# Patient Record
Sex: MALE | Race: OTHER | HISPANIC OR LATINO | Employment: FULL TIME | URBAN - METROPOLITAN AREA
[De-identification: names, ages, dates, MRNs, and addresses within clinical notes are randomized per-mention and may not be internally consistent; named-entity substitution may affect disease eponyms.]

---

## 2017-01-21 ENCOUNTER — APPOINTMENT (OUTPATIENT)
Dept: LAB | Facility: MEDICAL CENTER | Age: 52
End: 2017-01-21
Payer: COMMERCIAL

## 2017-01-21 ENCOUNTER — TRANSCRIBE ORDERS (OUTPATIENT)
Dept: ADMINISTRATIVE | Facility: HOSPITAL | Age: 52
End: 2017-01-21

## 2017-01-21 DIAGNOSIS — E55.9 UNSPECIFIED VITAMIN D DEFICIENCY: ICD-10-CM

## 2017-01-21 DIAGNOSIS — E78.5 HYPERLIPIDEMIA, UNSPECIFIED HYPERLIPIDEMIA TYPE: ICD-10-CM

## 2017-01-21 DIAGNOSIS — R73.9 BLOOD GLUCOSE ELEVATED: ICD-10-CM

## 2017-01-21 DIAGNOSIS — N42.9 UNSPECIFIED DISORDER OF PROSTATE: Primary | ICD-10-CM

## 2017-01-21 DIAGNOSIS — Z51.81 ENCOUNTER FOR THERAPEUTIC DRUG MONITORING: ICD-10-CM

## 2017-01-21 DIAGNOSIS — R35.0 URINARY FREQUENCY: ICD-10-CM

## 2017-01-21 DIAGNOSIS — N42.9 UNSPECIFIED DISORDER OF PROSTATE: ICD-10-CM

## 2017-01-21 DIAGNOSIS — I10 ESSENTIAL HYPERTENSION, MALIGNANT: ICD-10-CM

## 2017-01-21 LAB
25(OH)D3 SERPL-MCNC: 23.9 NG/ML (ref 30–100)
ALBUMIN SERPL BCP-MCNC: 3.9 G/DL (ref 3.5–5)
ALP SERPL-CCNC: 56 U/L (ref 46–116)
ALT SERPL W P-5'-P-CCNC: 21 U/L (ref 12–78)
ANION GAP SERPL CALCULATED.3IONS-SCNC: 8 MMOL/L (ref 4–13)
AST SERPL W P-5'-P-CCNC: 18 U/L (ref 5–45)
BILIRUB SERPL-MCNC: 0.59 MG/DL (ref 0.2–1)
BUN SERPL-MCNC: 18 MG/DL (ref 5–25)
CALCIUM SERPL-MCNC: 9 MG/DL (ref 8.3–10.1)
CHLORIDE SERPL-SCNC: 103 MMOL/L (ref 100–108)
CHOLEST SERPL-MCNC: 178 MG/DL (ref 50–200)
CO2 SERPL-SCNC: 31 MMOL/L (ref 21–32)
CREAT SERPL-MCNC: 1.13 MG/DL (ref 0.6–1.3)
EST. AVERAGE GLUCOSE BLD GHB EST-MCNC: 123 MG/DL
GFR SERPL CREATININE-BSD FRML MDRD: >60 ML/MIN/1.73SQ M
GLUCOSE SERPL-MCNC: 99 MG/DL (ref 65–140)
HBA1C MFR BLD: 5.9 % (ref 4.2–6.3)
HDLC SERPL-MCNC: 50 MG/DL (ref 40–60)
LDLC SERPL CALC-MCNC: 85 MG/DL (ref 0–100)
POTASSIUM SERPL-SCNC: 3.5 MMOL/L (ref 3.5–5.3)
PROT SERPL-MCNC: 8 G/DL (ref 6.4–8.2)
PSA SERPL-MCNC: 0.5 NG/ML (ref 0–4)
SODIUM SERPL-SCNC: 142 MMOL/L (ref 136–145)
TRIGL SERPL-MCNC: 216 MG/DL

## 2017-01-21 PROCEDURE — 36415 COLL VENOUS BLD VENIPUNCTURE: CPT

## 2017-01-21 PROCEDURE — 80061 LIPID PANEL: CPT

## 2017-01-21 PROCEDURE — 80053 COMPREHEN METABOLIC PANEL: CPT

## 2017-01-21 PROCEDURE — 82306 VITAMIN D 25 HYDROXY: CPT

## 2017-01-21 PROCEDURE — G0103 PSA SCREENING: HCPCS

## 2017-01-21 PROCEDURE — 83036 HEMOGLOBIN GLYCOSYLATED A1C: CPT

## 2017-09-21 ENCOUNTER — OFFICE VISIT (OUTPATIENT)
Dept: URGENT CARE | Facility: MEDICAL CENTER | Age: 52
End: 2017-09-21
Payer: COMMERCIAL

## 2017-09-21 PROCEDURE — 99203 OFFICE O/P NEW LOW 30 MIN: CPT

## 2017-10-06 ENCOUNTER — APPOINTMENT (OUTPATIENT)
Dept: LAB | Facility: MEDICAL CENTER | Age: 52
End: 2017-10-06
Payer: COMMERCIAL

## 2017-10-06 ENCOUNTER — TRANSCRIBE ORDERS (OUTPATIENT)
Dept: ADMINISTRATIVE | Facility: HOSPITAL | Age: 52
End: 2017-10-06

## 2017-10-06 DIAGNOSIS — R35.0 URINARY FREQUENCY: ICD-10-CM

## 2017-10-06 DIAGNOSIS — Z51.81 ENCOUNTER FOR THERAPEUTIC DRUG MONITORING: ICD-10-CM

## 2017-10-06 DIAGNOSIS — R73.9 BLOOD GLUCOSE ELEVATED: ICD-10-CM

## 2017-10-06 DIAGNOSIS — R80.9 PROTEINURIA, UNSPECIFIED TYPE: ICD-10-CM

## 2017-10-06 DIAGNOSIS — E78.5 HYPERLIPIDEMIA, UNSPECIFIED HYPERLIPIDEMIA TYPE: ICD-10-CM

## 2017-10-06 DIAGNOSIS — E78.5 HYPERLIPIDEMIA, UNSPECIFIED HYPERLIPIDEMIA TYPE: Primary | ICD-10-CM

## 2017-10-06 DIAGNOSIS — I10 ESSENTIAL HYPERTENSION, MALIGNANT: ICD-10-CM

## 2017-10-06 LAB
ALT SERPL W P-5'-P-CCNC: 19 U/L (ref 12–78)
AMORPH URATE CRY URNS QL MICRO: NORMAL /HPF
ANION GAP SERPL CALCULATED.3IONS-SCNC: 7 MMOL/L (ref 4–13)
BACTERIA UR QL AUTO: NORMAL /HPF
BILIRUB UR QL STRIP: ABNORMAL
BUN SERPL-MCNC: 15 MG/DL (ref 5–25)
CALCIUM SERPL-MCNC: 9.1 MG/DL (ref 8.3–10.1)
CHLORIDE SERPL-SCNC: 103 MMOL/L (ref 100–108)
CLARITY UR: ABNORMAL
CO2 SERPL-SCNC: 29 MMOL/L (ref 21–32)
COLOR UR: ABNORMAL
CREAT SERPL-MCNC: 1.07 MG/DL (ref 0.6–1.3)
CREAT UR-MCNC: 430 MG/DL
EST. AVERAGE GLUCOSE BLD GHB EST-MCNC: 131 MG/DL
GFR SERPL CREATININE-BSD FRML MDRD: 79 ML/MIN/1.73SQ M
GLUCOSE P FAST SERPL-MCNC: 94 MG/DL (ref 65–99)
GLUCOSE UR STRIP-MCNC: NEGATIVE MG/DL
HBA1C MFR BLD: 6.2 % (ref 4.2–6.3)
HGB UR QL STRIP.AUTO: ABNORMAL
KETONES UR STRIP-MCNC: NEGATIVE MG/DL
LDLC SERPL DIRECT ASSAY-MCNC: 90 MG/DL (ref 0–100)
LEUKOCYTE ESTERASE UR QL STRIP: NEGATIVE
MICROALBUMIN UR-MCNC: 31 MG/L (ref 0–20)
MICROALBUMIN/CREAT 24H UR: 7 MG/G CREATININE (ref 0–30)
NITRITE UR QL STRIP: NEGATIVE
NON-SQ EPI CELLS URNS QL MICRO: NORMAL /HPF
PH UR STRIP.AUTO: 5.5 [PH] (ref 4.5–8)
POTASSIUM SERPL-SCNC: 3.7 MMOL/L (ref 3.5–5.3)
PROT UR STRIP-MCNC: ABNORMAL MG/DL
RBC #/AREA URNS AUTO: NORMAL /HPF
SODIUM SERPL-SCNC: 139 MMOL/L (ref 136–145)
SP GR UR STRIP.AUTO: 1.03 (ref 1–1.03)
UROBILINOGEN UR QL STRIP.AUTO: 0.2 E.U./DL
WBC #/AREA URNS AUTO: NORMAL /HPF

## 2017-10-06 PROCEDURE — 36415 COLL VENOUS BLD VENIPUNCTURE: CPT

## 2017-10-06 PROCEDURE — 80048 BASIC METABOLIC PNL TOTAL CA: CPT

## 2017-10-06 PROCEDURE — 83036 HEMOGLOBIN GLYCOSYLATED A1C: CPT

## 2017-10-06 PROCEDURE — 84460 ALANINE AMINO (ALT) (SGPT): CPT

## 2017-10-06 PROCEDURE — 83721 ASSAY OF BLOOD LIPOPROTEIN: CPT

## 2017-10-06 PROCEDURE — 82570 ASSAY OF URINE CREATININE: CPT | Performed by: FAMILY MEDICINE

## 2017-10-06 PROCEDURE — 82043 UR ALBUMIN QUANTITATIVE: CPT | Performed by: FAMILY MEDICINE

## 2017-10-06 PROCEDURE — 81001 URINALYSIS AUTO W/SCOPE: CPT | Performed by: FAMILY MEDICINE

## 2018-06-06 ENCOUNTER — TRANSCRIBE ORDERS (OUTPATIENT)
Dept: ADMINISTRATIVE | Facility: HOSPITAL | Age: 53
End: 2018-06-06

## 2018-06-06 ENCOUNTER — APPOINTMENT (OUTPATIENT)
Dept: LAB | Facility: MEDICAL CENTER | Age: 53
End: 2018-06-06
Payer: COMMERCIAL

## 2018-06-06 DIAGNOSIS — E78.5 HYPERLIPIDEMIA, UNSPECIFIED HYPERLIPIDEMIA TYPE: Primary | ICD-10-CM

## 2018-06-06 DIAGNOSIS — Z51.81 MEDICATION MONITORING ENCOUNTER: ICD-10-CM

## 2018-06-06 DIAGNOSIS — E78.5 HYPERLIPIDEMIA, UNSPECIFIED HYPERLIPIDEMIA TYPE: ICD-10-CM

## 2018-06-06 DIAGNOSIS — I10 HYPERTENSION, UNSPECIFIED TYPE: ICD-10-CM

## 2018-06-06 DIAGNOSIS — E79.0 HYPERURICEMIA: ICD-10-CM

## 2018-06-06 LAB
ALBUMIN SERPL BCP-MCNC: 3.9 G/DL (ref 3.5–5)
ALP SERPL-CCNC: 57 U/L (ref 46–116)
ALT SERPL W P-5'-P-CCNC: 23 U/L (ref 12–78)
ANION GAP SERPL CALCULATED.3IONS-SCNC: 6 MMOL/L (ref 4–13)
AST SERPL W P-5'-P-CCNC: 26 U/L (ref 5–45)
BILIRUB SERPL-MCNC: 0.66 MG/DL (ref 0.2–1)
BUN SERPL-MCNC: 20 MG/DL (ref 5–25)
CALCIUM SERPL-MCNC: 9.3 MG/DL (ref 8.3–10.1)
CHLORIDE SERPL-SCNC: 104 MMOL/L (ref 100–108)
CO2 SERPL-SCNC: 29 MMOL/L (ref 21–32)
CREAT SERPL-MCNC: 1.05 MG/DL (ref 0.6–1.3)
EST. AVERAGE GLUCOSE BLD GHB EST-MCNC: 140 MG/DL
GFR SERPL CREATININE-BSD FRML MDRD: 81 ML/MIN/1.73SQ M
GLUCOSE P FAST SERPL-MCNC: 100 MG/DL (ref 65–99)
HBA1C MFR BLD: 6.5 % (ref 4.2–6.3)
LDLC SERPL DIRECT ASSAY-MCNC: 91 MG/DL (ref 0–100)
POTASSIUM SERPL-SCNC: 3.5 MMOL/L (ref 3.5–5.3)
PROT SERPL-MCNC: 7.5 G/DL (ref 6.4–8.2)
SODIUM SERPL-SCNC: 139 MMOL/L (ref 136–145)
URATE SERPL-MCNC: 5 MG/DL (ref 4.2–8)

## 2018-06-06 PROCEDURE — 80053 COMPREHEN METABOLIC PANEL: CPT

## 2018-06-06 PROCEDURE — 83721 ASSAY OF BLOOD LIPOPROTEIN: CPT

## 2018-06-06 PROCEDURE — 84550 ASSAY OF BLOOD/URIC ACID: CPT

## 2018-06-06 PROCEDURE — 83036 HEMOGLOBIN GLYCOSYLATED A1C: CPT

## 2018-06-06 PROCEDURE — 36415 COLL VENOUS BLD VENIPUNCTURE: CPT

## 2021-01-07 ENCOUNTER — TRANSCRIBE ORDERS (OUTPATIENT)
Dept: PHYSICAL THERAPY | Facility: MEDICAL CENTER | Age: 56
End: 2021-01-07

## 2021-01-07 ENCOUNTER — EVALUATION (OUTPATIENT)
Dept: PHYSICAL THERAPY | Facility: MEDICAL CENTER | Age: 56
End: 2021-01-07
Payer: COMMERCIAL

## 2021-01-07 DIAGNOSIS — M25.511 RIGHT SHOULDER PAIN, UNSPECIFIED CHRONICITY: Primary | ICD-10-CM

## 2021-01-07 PROCEDURE — 97161 PT EVAL LOW COMPLEX 20 MIN: CPT | Performed by: PHYSICAL THERAPIST

## 2021-01-07 PROCEDURE — 97110 THERAPEUTIC EXERCISES: CPT | Performed by: PHYSICAL THERAPIST

## 2021-01-07 PROCEDURE — 97140 MANUAL THERAPY 1/> REGIONS: CPT | Performed by: PHYSICAL THERAPIST

## 2021-01-07 NOTE — LETTER
2021    MD Ismael Santana    Patient: Elmo Conte   YOB: 1965   Date of Visit: 2021     Encounter Diagnosis     ICD-10-CM    1  Right shoulder pain, unspecified chronicity  M25 511        Dear Dr Rashmi Ruvalcaba: Thank you for your recent referral of Elmo Conte  Please review the attached evaluation summary from Wander's recent visit  Please verify that you agree with the plan of care by signing the attached order  If you have any questions or concerns, please do not hesitate to call  I sincerely appreciate the opportunity to share in the care of one of your patients and hope to have another opportunity to work with you in the near future  Sincerely,    Melody Davalos, PT      Referring Provider:      I certify that I have read the below Plan of Care and certify the need for these services furnished under this plan of treatment while under my care  MD Ismael Santana  Via Fax: [phone]          PT Evaluation     Today's date: 2021  Patient name: Elmo Conte  : 1965  MRN: [id number]  Referring provider: Hilda Rajan MD  Dx:   Encounter Diagnosis     ICD-10-CM    1  Right shoulder pain, unspecified chronicity  M25 511          Assessment  Assessment details: Pt is a pleasant 64 yo male presenting to physical therapy with R shoulder pain  Pt would benefit from skilled PT to address current impairments and return pt to pre-morbid function      Understanding of Dx/Px/POC: good   Prognosis: good    Goals  Impairment Goals  - Pt I with initial HEP in 1-2 visits  - Improve ROM equal to contralateral side in 4-6 weeks  - Increase strength to 5/5 in all affected areas in 4-6 weeks    Functional Goals  - Increase FOTO to at least 72 in 6-8 weeks  - Patient will be independent with comprehensive HEP in 6-8 weeks  - Patient will be able to reach for object overhead with min to difficulty in 6-8 weeks  - Patient will be able to lift/carry objects without provocation of symptoms in 6-8 weeks          Plan  Patient would benefit from: skilled physical therapy  Other planned modality interventions: Modalities prn  Planned therapy interventions: joint mobilization, neuromuscular re-education, patient education, postural training, strengthening, stretching, therapeutic activities, therapeutic exercise and home exercise program  Frequency: 2x week  Duration in weeks: 8  Treatment plan discussed with: patient        Subjective Evaluation    History of Present Illness  Mechanism of injury: Pt reports that approximately 3 months ago he woke up with severe R shoulder pain  He states that he couldn't lift his arm OH or lift a gallon of milt to the shelf  Most of his pain is in the biceps  He notes that over time his shoulder ROM has improved and the pain intensity and frequency have gotten better  He still at times experiences pain with lifting, rotational movements and fast movements  Pt had a cortisone injection, which helped significantly    Pain  Current pain ratin  At best pain ratin  At worst pain ratin    Social Support    Employment status: working ([employer])  Hand dominance: right  Exercise history: Walking, golf      Diagnostic Tests  X-ray: normal  Treatments  Previous treatment: injection treatment  Patient Goals  Patient goals for therapy: decreased pain, increased motion and increased strength          Objective     Postural Observations    Additional Postural Observation Details  + scapular protraction and anterior tilting B    Increased thoracic kyphosis    Palpation     Additional Palpation Details  + TTP R biceps muscle belly and LHBT    Cervical/Thoracic Screen   Cervical range of motion within normal limits  Thoracic range of motion within normal limits    Neurological Testing     Sensation     Shoulder   Left Shoulder Intact: light touch    Right Shoulder   Intact: light touch    Active Range of Motion     Additional Active Range of Motion Details  Pt lacks 10 degrees of AROM flexion and abduction on the R       Passive Range of Motion   Left Shoulder   Flexion: WFL  Abduction: WFL  External rotation 90°: 90 degrees   Internal rotation 90°: 50 degrees     Right Shoulder   Flexion: WFL  Abduction: WFL  External rotation 90°: 90 degrees   Internal rotation 90°: 50 degrees     Joint Play   Left Shoulder  Joints within functional limits are the anterior capsule, posterior capsule and inferior capsule  Right Shoulder  Joints within functional limits are the anterior capsule, posterior capsule and inferior capsule  Strength/Myotome Testing     Left Shoulder     Planes of Motion   Flexion: 5   Abduction: 5   External rotation at 0°: 5   Internal rotation at 0°: 5     Right Shoulder     Planes of Motion   Flexion: 5   Abduction: 5   External rotation at 0°: 5   Internal rotation at 0°: 5     Additional Strength Details  Elbow flexion and extension: 5/5 B    Prone flexion: R 3+/5 L 5/5  Prone horizontal abduction with palm down: R 3+ L 5/5  Prone horizontal abduction with thumb up: R 3+ L 5/5    Scaption and empty can: 5/5 B    Tests     Left Shoulder   Negative scapular relocation  Right Shoulder   Positive scapular relocation                Precautions: None      Manuals 1/7            GH jt mobs HK            PROM HK                                      Ther Ex 1/7            UBE NV            Scap 4 5"  x10            UE alpha Sup  NV            Wall slides Sup  NV            SL ER NV            Prone pro/ret NV

## 2021-01-07 NOTE — PROGRESS NOTES
PT Evaluation     Today's date: 2021  Patient name: Catherine Meyers  : 1965  MRN: [id number]  Referring provider: John Felder MD  Dx:   Encounter Diagnosis     ICD-10-CM    1  Right shoulder pain, unspecified chronicity  M25 511          Assessment  Assessment details: Pt is a pleasant 64 yo male presenting to physical therapy with R shoulder pain  Pt would benefit from skilled PT to address current impairments and return pt to pre-morbid function  Understanding of Dx/Px/POC: good   Prognosis: good    Goals  Impairment Goals  - Pt I with initial HEP in 1-2 visits  - Improve ROM equal to contralateral side in 4-6 weeks  - Increase strength to 5/5 in all affected areas in 4-6 weeks    Functional Goals  - Increase FOTO to at least 72 in 6-8 weeks  - Patient will be independent with comprehensive HEP in 6-8 weeks  - Patient will be able to reach for object overhead with min to difficulty in 6-8 weeks  - Patient will be able to lift/carry objects without provocation of symptoms in 6-8 weeks          Plan  Patient would benefit from: skilled physical therapy  Other planned modality interventions: Modalities prn  Planned therapy interventions: joint mobilization, neuromuscular re-education, patient education, postural training, strengthening, stretching, therapeutic activities, therapeutic exercise and home exercise program  Frequency: 2x week  Duration in weeks: 8  Treatment plan discussed with: patient        Subjective Evaluation    History of Present Illness  Mechanism of injury: Pt reports that approximately 3 months ago he woke up with severe R shoulder pain  He states that he couldn't lift his arm OH or lift a gallon of milt to the shelf  Most of his pain is in the biceps  He notes that over time his shoulder ROM has improved and the pain intensity and frequency have gotten better  He still at times experiences pain with lifting, rotational movements and fast movements         Pt had a cortisone injection, which helped significantly  Pain  Current pain ratin  At best pain ratin  At worst pain ratin    Social Support    Employment status: working ([employer])  Hand dominance: right  Exercise history: Walking, golf      Diagnostic Tests  X-ray: normal  Treatments  Previous treatment: injection treatment  Patient Goals  Patient goals for therapy: decreased pain, increased motion and increased strength          Objective     Postural Observations    Additional Postural Observation Details  + scapular protraction and anterior tilting B    Increased thoracic kyphosis    Palpation     Additional Palpation Details  + TTP R biceps muscle belly and LHBT    Cervical/Thoracic Screen   Cervical range of motion within normal limits  Thoracic range of motion within normal limits    Neurological Testing     Sensation     Shoulder   Left Shoulder   Intact: light touch    Right Shoulder   Intact: light touch    Active Range of Motion     Additional Active Range of Motion Details  Pt lacks 10 degrees of AROM flexion and abduction on the R       Passive Range of Motion   Left Shoulder   Flexion: WFL  Abduction: WFL  External rotation 90°: 90 degrees   Internal rotation 90°: 50 degrees     Right Shoulder   Flexion: WFL  Abduction: WFL  External rotation 90°: 90 degrees   Internal rotation 90°: 50 degrees     Joint Play   Left Shoulder  Joints within functional limits are the anterior capsule, posterior capsule and inferior capsule  Right Shoulder  Joints within functional limits are the anterior capsule, posterior capsule and inferior capsule       Strength/Myotome Testing     Left Shoulder     Planes of Motion   Flexion: 5   Abduction: 5   External rotation at 0°: 5   Internal rotation at 0°: 5     Right Shoulder     Planes of Motion   Flexion: 5   Abduction: 5   External rotation at 0°: 5   Internal rotation at 0°: 5     Additional Strength Details  Elbow flexion and extension: 5/5 B    Prone flexion: R 3+/5 L 5/5  Prone horizontal abduction with palm down: R 3+ L 5/5  Prone horizontal abduction with thumb up: R 3+ L 5/5    Scaption and empty can: 5/5 B    Tests     Left Shoulder   Negative scapular relocation  Right Shoulder   Positive scapular relocation                Precautions: None      Manuals 1/7            GH jt mobs HK            PROM HK                                      Ther Ex 1/7            UBE NV            Scap 4 5"  x10            UE alpha Sup  NV            Wall slides Sup  NV            SL ER NV            Prone pro/ret NV

## 2021-01-11 ENCOUNTER — OFFICE VISIT (OUTPATIENT)
Dept: PHYSICAL THERAPY | Facility: MEDICAL CENTER | Age: 56
End: 2021-01-11
Payer: COMMERCIAL

## 2021-01-11 DIAGNOSIS — M25.511 RIGHT SHOULDER PAIN, UNSPECIFIED CHRONICITY: Primary | ICD-10-CM

## 2021-01-11 PROCEDURE — 97140 MANUAL THERAPY 1/> REGIONS: CPT

## 2021-01-11 PROCEDURE — 97110 THERAPEUTIC EXERCISES: CPT

## 2021-01-11 PROCEDURE — 97112 NEUROMUSCULAR REEDUCATION: CPT

## 2021-01-11 NOTE — PROGRESS NOTES
Daily Note     Today's date: 2021  Patient name: Cecile Lazar  : 1965  MRN: [id number]  Referring provider: Evette Allen MD  Dx:   Encounter Diagnosis     ICD-10-CM    1  Right shoulder pain, unspecified chronicity  M25 511                   Subjective: patient stated that he was sore for about an hour after last session  He is doing better today, but some tingling in the biceps  Objective: See treatment diary below      Assessment: Tolerated treatment well  Patient with god tolerance to all new TE, VC to correct form with good carryover  Minimal UT compinsation with band tasks  Fatigue noted with overhead tasks   Patient would benefit from continued PT      Plan: continue per plan of care     Precautions: None      Manuals            GH jt mobs HK            PROM HK MSB           HPL  MSB                         Ther Ex            UBE NV Retro 4'           Scap 4 5"  x10 3x10 RTB ea           UE alpha Sup  NV 1x           Wall slides Sup  NV 15x           SL ER NV 3x10           Prone pro/ret NV 3x10

## 2021-01-15 ENCOUNTER — OFFICE VISIT (OUTPATIENT)
Dept: PHYSICAL THERAPY | Facility: MEDICAL CENTER | Age: 56
End: 2021-01-15
Payer: COMMERCIAL

## 2021-01-15 DIAGNOSIS — M25.511 RIGHT SHOULDER PAIN, UNSPECIFIED CHRONICITY: Primary | ICD-10-CM

## 2021-01-15 PROCEDURE — 97110 THERAPEUTIC EXERCISES: CPT | Performed by: PHYSICAL THERAPIST

## 2021-01-15 PROCEDURE — 97112 NEUROMUSCULAR REEDUCATION: CPT | Performed by: PHYSICAL THERAPIST

## 2021-01-15 PROCEDURE — 97140 MANUAL THERAPY 1/> REGIONS: CPT | Performed by: PHYSICAL THERAPIST

## 2021-01-15 NOTE — PROGRESS NOTES
Daily Note     Today's date: 1/15/2021  Patient name: Camila Haddad  : 1965  MRN: [id number]  Referring provider: Mago Rivas MD  Dx:   Encounter Diagnosis     ICD-10-CM    1  Right shoulder pain, unspecified chronicity  M25 511                   Subjective: Pt reports that he is feeling much better  He states that he did not have any pain during his last PT session or afterward  Pt notes that he developed a bruise on his biceps  Objective: See treatment diary below    + ecchymosis R biceps    Assessment: Tolerated treatment well  Patient demonstrated fatigue post treatment, exhibited good technique with therapeutic exercises and would benefit from continued PT  Plan: Continue per plan of care        Precautions: None      Manuals 1/7 1/11 1/15          GH jt mobs HK  HK          PROM HK MSB HK          HPL  MSB  HK                       Ther Ex 1/7 1/11 1/15          UBE NV Retro 4' 3F/3B          Scap 4 5"  x10 3x10 RTB ea 3x10  red          UE alpha Sup  NV 1x 2X  sup          Wall slides Sup  NV 15x 3x10          SL ER NV 3x10 1#  3x15          Prone pro/ret NV 3x10 3x10

## 2021-01-18 ENCOUNTER — APPOINTMENT (OUTPATIENT)
Dept: PHYSICAL THERAPY | Facility: MEDICAL CENTER | Age: 56
End: 2021-01-18
Payer: COMMERCIAL

## 2021-01-19 ENCOUNTER — OFFICE VISIT (OUTPATIENT)
Dept: PHYSICAL THERAPY | Facility: MEDICAL CENTER | Age: 56
End: 2021-01-19
Payer: COMMERCIAL

## 2021-01-19 DIAGNOSIS — M25.511 RIGHT SHOULDER PAIN, UNSPECIFIED CHRONICITY: Primary | ICD-10-CM

## 2021-01-19 PROCEDURE — 97140 MANUAL THERAPY 1/> REGIONS: CPT

## 2021-01-19 PROCEDURE — 97110 THERAPEUTIC EXERCISES: CPT

## 2021-01-19 PROCEDURE — 97112 NEUROMUSCULAR REEDUCATION: CPT

## 2021-01-19 NOTE — PROGRESS NOTES
Daily Note     Today's date: 2021  Patient name: Tere Ag  : 1965  MRN: [id number]  Referring provider: Camille Mckeon MD  Dx:   Encounter Diagnosis     ICD-10-CM    1  Right shoulder pain, unspecified chronicity  M25 511                   Subjective: Pt reports that his shoulder is beginning to feel better and has been performing his ex's at home  Objective: See treatment diary below      Assessment: Tolerated treatment well  Patient demonstrated fatigue post treatment, exhibited good technique with therapeutic exercises and would benefit from continued PT  Pt's mobility is improving well with manuals  Improved technique with Scap 4 ex's - issue TB and ex's at NV  Plan: Continue per plan of care        Precautions: None      Manuals 1/7 1/11 1/15 1/19         [address] [facility] mobs HK  HK          PROM HK MSB HK KO         HPL  MSB  HK KO                      Ther Ex 1/7 1/11 1/15 1/19         UBE NV Retro 4' 3F/3B 3F/3B         Scap 4 5"  x10 3x10 RTB ea 3x10  red 3x12  Red         UE alpha Sup  NV 1x 2X  sup 3x  sup         Wall slides Sup  NV 15x 3x10 3x10  sup         SL ER NV 3x10 1#  3x15 1#  3x15         Prone pro/ret NV 3x10 3x10 3x10         Prone raises    NV

## 2021-01-21 ENCOUNTER — OFFICE VISIT (OUTPATIENT)
Dept: PHYSICAL THERAPY | Facility: MEDICAL CENTER | Age: 56
End: 2021-01-21
Payer: COMMERCIAL

## 2021-01-21 DIAGNOSIS — M25.511 RIGHT SHOULDER PAIN, UNSPECIFIED CHRONICITY: Primary | ICD-10-CM

## 2021-01-21 PROCEDURE — 97140 MANUAL THERAPY 1/> REGIONS: CPT

## 2021-01-21 PROCEDURE — 97110 THERAPEUTIC EXERCISES: CPT

## 2021-01-21 PROCEDURE — 97112 NEUROMUSCULAR REEDUCATION: CPT

## 2021-01-21 NOTE — PROGRESS NOTES
Daily Note     Today's date: 2021  Patient name: Author Erlinda  : 1965  MRN: [id number]  Referring provider: Manolo Tavarez MD  Dx:   Encounter Diagnosis     ICD-10-CM    1  Right shoulder pain, unspecified chronicity  M25 511                   Subjective: Pt reports that his shoulder was sore after LV  Objective: See treatment diary below      Assessment: Tolerated treatment well  Patient demonstrated fatigue post treatment, exhibited good technique with therapeutic exercises and would benefit from continued PT  Pt's scapular mobility and activation are improving  Issued updated HEP with Red Tb  Plan: Continue per plan of care        Precautions: None      Manuals 1/7 1/11 1/15 1/19 1/21        [address] jt mobs HK  HK          PROM HK MSB HK KO KO        HPL  MSB  HK KO KO                     Ther Ex 1/7 1/11 1/15 1/19 1/21        UBE NV Retro 4' 3F/3B 3F/3B 3F/3B        Scap 4 5"  x10 3x10 RTB ea 3x10  red 3x12  Red 3x15  Red        UE alpha Sup  NV 1x 2X  sup 3x  sup 1#  Sup  3x        Wall slides Sup  NV 15x 3x10 3x10  sup 3x10  sup        SL ER NV 3x10 1#  3x15 1#  3x15 1#  3x15        Prone pro/ret NV 3x10 3x10 3x10 3x10        Prone raises    NV x10

## 2021-01-25 ENCOUNTER — OFFICE VISIT (OUTPATIENT)
Dept: PHYSICAL THERAPY | Facility: MEDICAL CENTER | Age: 56
End: 2021-01-25
Payer: COMMERCIAL

## 2021-01-25 DIAGNOSIS — M25.511 RIGHT SHOULDER PAIN, UNSPECIFIED CHRONICITY: Primary | ICD-10-CM

## 2021-01-25 PROCEDURE — 97112 NEUROMUSCULAR REEDUCATION: CPT

## 2021-01-25 PROCEDURE — 97140 MANUAL THERAPY 1/> REGIONS: CPT

## 2021-01-25 PROCEDURE — 97110 THERAPEUTIC EXERCISES: CPT

## 2021-01-25 NOTE — PROGRESS NOTES
Daily Note     Today's date: 2021  Patient name: Gerson Huerta  : 1965  MRN: [id number]  Referring provider: Lida Mars MD  Dx:   Encounter Diagnosis     ICD-10-CM    1  Right shoulder pain, unspecified chronicity  M25 511                   Subjective: Pt reports that he moved his son into college this past weekend and he did not have any shoulder pain  Objective: See treatment diary below      Assessment: Tolerated treatment well  Patient demonstrated fatigue post treatment, exhibited good technique with therapeutic exercises and would benefit from continued PT   Pt was able to progress today in TE without any pain  Pt's bruise on R bicep is dissipating  Plan: Continue per plan of care        Precautions: None      Manuals 1/7 1/11 1/15 1/19 1/21 1/25       GH jt mobs HK  HK          PROM HK MSB HK KO KO KO       HPL  MSB  HK KO KO KO                    Ther Ex 1/7 1/11 1/15 1/19 1/21 1/25       UBE NV Retro 4' 3F/3B 3F/3B 3F/3B 3F/3B       Scap 4 5"  x10 3x10 RTB ea 3x10  red 3x12  Red 3x15  Red 3x10  Green       UE alpha Sup  NV 1x 2X  sup 3x  sup 1#  Sup  3x Stand  2x       Wall slides Sup  NV 15x 3x10 3x10  sup 3x10  sup Stand  3x10       SL ER NV 3x10 1#  3x15 1#  3x15 1#  3x15 1#  3x15       Prone pro/ret NV 3x10 3x10 3x10 3x10 3x10       Prone raises    NV x10 3x10

## 2021-01-28 ENCOUNTER — APPOINTMENT (OUTPATIENT)
Dept: PHYSICAL THERAPY | Facility: MEDICAL CENTER | Age: 56
End: 2021-01-28
Payer: COMMERCIAL

## 2021-02-26 DIAGNOSIS — Z23 ENCOUNTER FOR IMMUNIZATION: ICD-10-CM

## 2021-03-12 ENCOUNTER — IMMUNIZATIONS (OUTPATIENT)
Dept: FAMILY MEDICINE CLINIC | Facility: HOSPITAL | Age: 56
End: 2021-03-12

## 2021-03-12 DIAGNOSIS — Z23 ENCOUNTER FOR IMMUNIZATION: Primary | ICD-10-CM

## 2021-03-12 PROCEDURE — 91301 SARS-COV-2 / COVID-19 MRNA VACCINE (MODERNA) 100 MCG: CPT

## 2021-03-12 PROCEDURE — 0011A SARS-COV-2 / COVID-19 MRNA VACCINE (MODERNA) 100 MCG: CPT

## 2021-04-08 ENCOUNTER — IMMUNIZATIONS (OUTPATIENT)
Dept: FAMILY MEDICINE CLINIC | Facility: HOSPITAL | Age: 56
End: 2021-04-08

## 2021-04-08 DIAGNOSIS — Z23 ENCOUNTER FOR IMMUNIZATION: Primary | ICD-10-CM

## 2021-04-08 PROCEDURE — 0012A SARS-COV-2 / COVID-19 MRNA VACCINE (MODERNA) 100 MCG: CPT

## 2021-04-08 PROCEDURE — 91301 SARS-COV-2 / COVID-19 MRNA VACCINE (MODERNA) 100 MCG: CPT

## 2022-07-15 ENCOUNTER — OFFICE VISIT (OUTPATIENT)
Dept: FAMILY MEDICINE CLINIC | Facility: CLINIC | Age: 57
End: 2022-07-15
Payer: COMMERCIAL

## 2022-07-15 DIAGNOSIS — E66.09 CLASS 1 OBESITY DUE TO EXCESS CALORIES WITHOUT SERIOUS COMORBIDITY WITH BODY MASS INDEX (BMI) OF 32.0 TO 32.9 IN ADULT: ICD-10-CM

## 2022-07-15 DIAGNOSIS — M25.562 ARTHRALGIA OF BOTH KNEES: ICD-10-CM

## 2022-07-15 DIAGNOSIS — M25.561 ARTHRALGIA OF BOTH KNEES: ICD-10-CM

## 2022-07-15 DIAGNOSIS — I10 PRIMARY HYPERTENSION: ICD-10-CM

## 2022-07-15 DIAGNOSIS — E78.5 HYPERLIPIDEMIA, UNSPECIFIED HYPERLIPIDEMIA TYPE: ICD-10-CM

## 2022-07-15 DIAGNOSIS — E11.9 TYPE 2 DIABETES MELLITUS WITHOUT COMPLICATION, WITHOUT LONG-TERM CURRENT USE OF INSULIN (HCC): Primary | ICD-10-CM

## 2022-07-15 PROBLEM — M65.9 SYNOVITIS AND TENOSYNOVITIS: Status: ACTIVE | Noted: 2022-07-15

## 2022-07-15 PROBLEM — M19.91 LOCALIZED, PRIMARY OSTEOARTHRITIS: Status: ACTIVE | Noted: 2022-07-15

## 2022-07-15 PROBLEM — M65.90 SYNOVITIS AND TENOSYNOVITIS: Status: ACTIVE | Noted: 2022-07-15

## 2022-07-15 PROBLEM — M25.511 PAIN IN JOINT OF RIGHT SHOULDER: Status: ACTIVE | Noted: 2020-12-23

## 2022-07-15 PROBLEM — M75.40 IMPINGEMENT SYNDROME OF SHOULDER REGION: Status: ACTIVE | Noted: 2020-12-23

## 2022-07-15 PROBLEM — IMO0002 CURRENT TEAR OF MEDIAL CARTILAGE OR MENISCUS OF KNEE: Status: ACTIVE | Noted: 2022-07-15

## 2022-07-15 PROBLEM — M75.31 CALCIFIC TENDINITIS OF RIGHT SHOULDER: Status: ACTIVE | Noted: 2020-12-23

## 2022-07-15 PROBLEM — R31.29 MICROHEMATURIA: Status: ACTIVE | Noted: 2022-07-12

## 2022-07-15 PROBLEM — M22.40 CHONDROMALACIA PATELLAE: Status: ACTIVE | Noted: 2022-07-15

## 2022-07-15 PROBLEM — M65.30 ACQUIRED TRIGGER FINGER: Status: ACTIVE | Noted: 2020-05-06

## 2022-07-15 PROCEDURE — 99204 OFFICE O/P NEW MOD 45 MIN: CPT | Performed by: FAMILY MEDICINE

## 2022-07-15 PROCEDURE — 3725F SCREEN DEPRESSION PERFORMED: CPT | Performed by: FAMILY MEDICINE

## 2022-07-15 RX ORDER — DICLOFENAC SODIUM 75 MG/1
75 TABLET, DELAYED RELEASE ORAL 2 TIMES DAILY
COMMUNITY
Start: 2022-06-23 | End: 2022-09-12

## 2022-07-15 NOTE — PATIENT INSTRUCTIONS
10% - bad control"> 10% - bad control,Hemoglobin A1c (HbA1c) greater than 10% indicating poor diabetic control,Haemoglobin A1c greater than 10% indicating poor diabetic control">   Diabetes Mellitus Type 2 in Adults, Ambulatory Care   GENERAL INFORMATION:   Diabetes mellitus type 2  is a disease that affects how your body uses glucose (sugar)  Insulin helps move sugar out of the blood so it can be used for energy  Normally, when the blood sugar level increases, the pancreas makes more insulin  Type 2 diabetes develops because either the body cannot make enough insulin, or it cannot use the insulin correctly  After many years, your pancreas may stop making insulin  Common symptoms include the following:   · More hunger or thirst than usual     · Frequent urination     · Weight loss without trying     · Blurred vision  Seek immediate care for the following symptoms:   · Severe abdominal pain, or pain that spreads to your back  You may also be vomiting  · Trouble staying awake or focusing    · Shaking or sweating    · Blurred or double vision    · Breath has a fruity, sweet smell    · Breathing is deep and labored, or rapid and shallow    · Heartbeat is fast and weak  Treatment for diabetes mellitus type 2  includes keeping your blood sugar at a normal level  You must eat the right foods, and exercise regularly  You may also need medicine if you cannot control your blood sugar level with nutrition and exercise  Manage diabetes mellitus type 2:   · Check your blood sugar level  You will be taught how to check a small drop of blood in a glucose monitor  Ask your healthcare provider when and how often to check during the day  Ask your healthcare provider what your blood sugar levels should be when you check them  · Keep track of carbohydrates (sugar and starchy foods)  Your blood sugar level can get too high if you eat too many carbohydrates   Your dietitian will help you plan meals and snacks that have the right amount of carbohydrates  · Eat low-fat foods  Some examples are skinless chicken and low-fat milk  · Eat less sodium (salt)  Some examples of high-sodium foods to limit are soy sauce, potato chips, and soup  Do not add salt to food you cook  Limit your use of table salt  · Eat high-fiber foods  Foods that are a good source of fiber include vegetables, whole grain bread, and beans  · Limit alcohol  Alcohol affects your blood sugar level and can make it harder to manage your diabetes  Women should limit alcohol to 1 drink a day  Men should limit alcohol to 2 drinks a day  A drink of alcohol is 12 ounces of beer, 5 ounces of wine, or 1½ ounces of liquor  · Get regular exercise  Exercise can help keep your blood sugar level steady, decrease your risk of heart disease, and help you lose weight  Exercise for at least 30 minutes, 5 days a week  Include muscle strengthening activities 2 days each week  Work with your healthcare provider to create an exercise plan  · Check your feet each day  for injuries or open sores  Ask your healthcare provider for activities you can do if you have an open sore  · Quit smoking  If you smoke, it is never too late to quit  Smoking can worsen the problems that may occur with diabetes  Ask your healthcare provider for information about how to stop smoking if you are having trouble quitting  · Ask about your weight:  Ask healthcare providers if you need to lose weight, and how much to lose  Ask them to help you with a weight loss program  Even a 10 to 15 pound weight loss can help you manage your blood sugar level  · Carry medical alert identification  Wear medical alert jewelry or carry a card that says you have diabetes  Ask your healthcare provider where to get these items  · Ask about vaccines  Diabetes puts you at risk of serious illness if you get the flu, pneumonia, or hepatitis   Ask your healthcare provider if you should get a flu, pneumonia, or hepatitis B vaccine, and when to get the vaccine  Follow up with your healthcare provider as directed:  Write down your questions so you remember to ask them during your visits  CARE AGREEMENT:   You have the right to help plan your care  Learn about your health condition and how it may be treated  Discuss treatment options with your caregivers to decide what care you want to receive  You always have the right to refuse treatment  The above information is an  only  It is not intended as medical advice for individual conditions or treatments  Talk to your doctor, nurse or pharmacist before following any medical regimen to see if it is safe and effective for you  © 2014 [address] is for End User's use only and may not be sold, redistributed or otherwise used for commercial purposes  All illustrations and images included in CareNotes® are the copyrighted property of [employer]  or Jose Rai  Basic Carbohydrate Counting   AMBULATORY CARE:   Carbohydrate counting  is a way to plan your meals by counting the amount of carbohydrate in foods  Carbohydrates are the sugars, starches, and fiber found in fruit, grains, vegetables, and milk products  Carbohydrates increase your blood sugar levels  Carbohydrate counting can help you eat the right amount of carbohydrate to keep your blood sugar levels under control  What you need to know about planning meals using carbohydrate counting:  · A dietitian or healthcare provider will help you develop a healthy meal plan that works best for you  You will be taught how much carbohydrate to eat or drink for each meal and snack  Your meal plan will be based on your age, weight, usual food intake, and physical activity level  If you have diabetes, it will also include your blood sugar levels and diabetes medicine   Once you know how much carbohydrate you should eat, you can decide what type of food you want to eat  · You will need to know what foods contain carbohydrate and how much they contain  Keep track of the amount of carbohydrate in meals and snacks in order to follow your meal plan  Do not avoid carbohydrates or skip meals  Your blood sugar may fall too low if you do not eat enough carbohydrate or you skip meals  Foods that contain carbohydrate:   · Breads:  Each serving of food listed below contains about 15 g of carbohydrate   ? 1 slice of bread (1 ounce) or 1 flour or corn tortilla (6 inch)    ? ½ of a hamburger bun or ¼ of a large bagel (about 1 ounce)    ? 1 pancake (about 4 inches across and ¼ inch thick)    · Cereals and grains:  Serving sizes of ready-to-eat cereals vary  Look at the serving size and the total carbohydrate amount listed on the food label  Each serving of food listed below contains about 15 g of carbohydrate   ? ¾ cup of dry, unsweetened, ready-to-eat cereal or ¼ cup of low-fat granola     ? ½ cup of oatmeal or other cooked cereal     ? ? cup of cooked rice or pasta    · Starchy vegetables and beans:  Each serving of food listed below contains about 15 g of carbohydrate   ? ½ cup of corn, green peas, sweet potatoes, or mashed potatoes    ? ¼ of a large baked potato    ? ½ cup of beans, lentils, and peas (garbanzo, pearce, kidney, white, split, black-eyed)    · Crackers and snacks:  Each serving of food listed below contains about 15 g of carbohydrate   ? 3 gutierrez cracker squares or 8 animal crackers     ? 6 saltine-type crackers    ? 3 cups of popcorn or ¾ ounce of pretzels, potato chips, or tortilla chips    · Fruit:  Each serving of food listed below contains about 15 g of carbohydrate   ? 1 small (4 ounce) piece of fresh fruit or ¾ to 1 cup of fresh fruit    ? ½ cup of canned or frozen fruit, packed in natural juice    ? ½ cup (4 ounces) of unsweetened fruit juice    ?  2 tablespoons of dried fruit    · Desserts or sugary foods:  Each serving of food listed below contains about 15 g of carbohydrate   ? 2-inch square unfrosted cake or brownie     ? 2 small cookies    ? ½ cup of ice cream, frozen yogurt, or nondairy frozen yogurt    ? ¼ cup of sherbet or sorbet    ? 1 tablespoon of regular syrup, jam, or jelly    ? 2 tablespoons of light syrup    · Milk and yogurt:  Foods from the milk group contain about 12 g of carbohydrate per serving  ? 1 cup of fat-free or low-fat milk    ? 1 cup of soy milk    ? ? cup of fat-free, yogurt sweetened with artificial sweetener    · Non-starchy vegetables:  Each serving contains about 5 g of carbohydrate   Three servings of non-starch vegetables count as 1 carbohydrate serving  ? ½ cup of cooked vegetables or 1 cup of raw vegetables  This includes beets, broccoli, cabbage, cauliflower, cucumber, mushrooms, tomatoes, and zucchini    ? ½ cup of vegetable juice    How to use carbohydrate counting to plan meals:   · Count carbohydrate amounts using serving sizes:      ? Pasta dinner example: You plan to have pasta, tossed salad, and an 8-ounce glass of milk  Your healthcare provider tells you that you may have 4 carbohydrate servings for dinner  One carbohydrate serving of pasta is ? cup  One cup of pasta will equal 3 carbohydrate servings  An 8-ounce glass of milk will count as 1 carbohydrate serving  These amounts of food would equal 4 carbohydrate servings  One cup of tossed salad does not count toward your carbohydrate servings  · Count carbohydrate amounts using food labels:  Find the total amount of carbohydrate in a packaged food by reading the food label  Food labels tell you the serving size of the food and the total carbohydrate amount in each serving  Find the serving size on the food label and then decide how many servings you will eat  Multiply the number of servings you plan to eat by the carbohydrate amount per serving  ? Granola bar snack example:   Your meal plan allows you to have 2 carbohydrate servings (30 grams) of carbohydrate for a snack  You plan to eat 1 package of granola bars, which contains 2 bars  According to the food label, the serving size of food in this package is 1 bar  Each serving (1 bar) contains 25 grams of carbohydrate  The total amount of carbohydrate in this package of granola bars would be 50 g  Based on your meal plan, you should eat only 1 bar  Follow up with your doctor as directed:  Write down your questions so you remember to ask them during your visits  © Copyright [employer] 2022 Information is for End User's use only and may not be sold, redistributed or otherwise used for commercial purposes  All illustrations and images included in CareNotes® are the copyrighted property of A [employer] A M [employer] Inc  or [facility] Armaan Sewell   The above information is an  only  It is not intended as medical advice for individual conditions or treatments  Talk to your doctor, nurse or pharmacist before following any medical regimen to see if it is safe and effective for you  What to Do if Your Blood Sugar is Low   AMBULATORY CARE:   Low blood sugar levels  (hypoglycemia) can happen with Type 1 and Type 2 diabetes  Low levels are more likely to happen if you use insulin  Hypoglycemia can cause you to have falls, accidents, and injuries  A blood sugar level that gets too low can lead to seizures, coma, and death  Learn to recognize the symptoms early so you can get treatment quickly  When your blood sugar is low you may feel:  · Sweaty    · Nervous or shaky    · Anxious or irritable    · Confused    · A fast, pounding heartbeat    · Extremely hungry    Have someone call your local emergency number (911 in the [address] [place] [address],[address]) if:   · You cannot be woken  · You have a seizure  Call your doctor if:   · You have symptoms of a low blood sugar level, such as trouble thinking, sweating, or a pounding heartbeat  · Your blood sugar level is lower than normal and it does not improve with treatment  · You often have lower blood sugar levels than your target goals  · You have trouble coping with your illness, or you feel anxious or depressed  · You have questions or concerns about your condition or care  What to do if you have symptoms of low blood sugar:   · Check your blood sugar level, if possible  Your blood sugar level is too low if it is at or below 70 mg/dL  · Eat or drink 15 grams of fast-acting carbohydrate  Fast-acting carbohydrates will raise your blood sugar level quickly  Examples of 15 grams of fast-acting carbohydrates:     ? 4 ounces (½ cup) of fruit juice     ? 4 ounces of regular soda    ? 2 tablespoons of raisins     ? 1 tube of glucose gel or 3 to 4 glucose tablets       · Check your blood sugar level 15 minutes later  If the level is still low (less than 100 mg/dL), eat another 15 grams of carbohydrate  When the level returns to 100 mg/dL, eat a snack or meal that contains carbohydrates  This will help prevent another drop in blood sugar  · Teach people close to you how to use your glucagon kit  Your blood sugar may be too low for you to be awake  People need to know when and how to use your kit  Prevent low blood sugar levels:  Prevent low blood sugar by knowing what increases your risk  Ask your healthcare provider for ways to prevent low blood sugar levels  Any of the following can increase your risk of low blood sugar:  · Fasting for tests or procedures    · During or after intense exercise    · Late or postponed meals    · Sleeping (you may need a bedtime snack)     · Drinking alcohol if you use insulin or insulin releasing pills    Follow up with your doctor as directed:  Write down your questions so you remember to ask them during your visits  © Copyright [employer] 2022 Information is for End User's use only and may not be sold, redistributed or otherwise used for commercial purposes   All illustrations and images included in CareNotes® are the copyrighted property of A [employer] A M , Inc  or [facility] Armaan Sewell   The above information is an  only  It is not intended as medical advice for individual conditions or treatments  Talk to your doctor, nurse or pharmacist before following any medical regimen to see if it is safe and effective for you

## 2022-07-17 VITALS
SYSTOLIC BLOOD PRESSURE: 128 MMHG | TEMPERATURE: 97.6 F | OXYGEN SATURATION: 95 % | BODY MASS INDEX: 32.46 KG/M2 | HEIGHT: 68 IN | HEART RATE: 90 BPM | WEIGHT: 214.2 LBS | RESPIRATION RATE: 16 BRPM | DIASTOLIC BLOOD PRESSURE: 84 MMHG

## 2022-07-17 PROBLEM — E66.811 CLASS 1 OBESITY DUE TO EXCESS CALORIES WITHOUT SERIOUS COMORBIDITY WITH BODY MASS INDEX (BMI) OF 32.0 TO 32.9 IN ADULT: Status: ACTIVE | Noted: 2022-07-17

## 2022-07-17 PROBLEM — E11.9 TYPE 2 DIABETES MELLITUS WITHOUT COMPLICATION, WITHOUT LONG-TERM CURRENT USE OF INSULIN (HCC): Status: ACTIVE | Noted: 2022-07-17

## 2022-07-17 PROBLEM — E66.09 CLASS 1 OBESITY DUE TO EXCESS CALORIES WITHOUT SERIOUS COMORBIDITY WITH BODY MASS INDEX (BMI) OF 32.0 TO 32.9 IN ADULT: Status: ACTIVE | Noted: 2022-07-17

## 2022-07-17 PROBLEM — I10 PRIMARY HYPERTENSION: Status: ACTIVE | Noted: 2022-07-17

## 2022-07-17 NOTE — PROGRESS NOTES
Assessment/Plan:    No problem-specific Assessment & Plan notes found for this encounter  Patient overall stable  A1c is excellent  On metformin and Ozempic  No episodes of hypoglycemia  On ACE-inhibitor  Blood pressure stable  On rosuvastatin  LDL cholesterol looking at previous results is less than 100  Next step for this patient is going to be new bilateral knee replacement  These will be done separately  Pre-admission testing be done  He would back her in a month for preop clearance  May need to be seen by Cardiology pending work [facility] decides to do  Otherwise recommend yearly diabetic eye and foot examination  CMP and hemoglobin A1c ordered for next visit  Time spent today 50 minutes with greater than 50% counseling  Diagnoses and all orders for this visit:    Type 2 diabetes mellitus without complication, without long-term current use of insulin (HCC)  -     Hemoglobin A1C; Future  -     Comprehensive metabolic panel; Future    Need for hepatitis C screening test    Screening for HIV (human immunodeficiency virus)    Other orders  -     diclofenac (VOLTAREN) 75 mg EC tablet; Take 75 mg by mouth 2 (two) times a day        1  Type 2 diabetes mellitus without complication, without long-term current use of insulin (HCC)  Hemoglobin A1C    Comprehensive metabolic panel   2  Need for hepatitis C screening test     3  Screening for HIV (human immunodeficiency virus)         Subjective:        Patient ID: Mitch Cameron is a 61 y o  male  Chief Complaint   Patient presents with   [facility] Patient Visit     Patient would like lumps on back looked at and needs refills on medications, and is requesting blood work        48-year-old male here as new patient to establish  Has purposely lost weight in preparation for left knee replacement surgery  Has been following with [facility] but his primary care doctor has retired  Overall well    No chest pains or shortness of breath  Would like some moles on his back checked  Wife is also established year  The following portions of the patient's history were reviewed and updated as appropriate: past medical history, past surgical history and problem list       Review of Systems   Constitutional: Negative for appetite change, fatigue, fever and unexpected weight change  HENT: Negative for congestion, ear pain, postnasal drip, rhinorrhea, sinus pressure, sinus pain and sore throat  Eyes: Negative for redness and visual disturbance  Respiratory: Negative for chest tightness and shortness of breath  Cardiovascular: Negative for chest pain, palpitations and leg swelling  Gastrointestinal: Negative for abdominal distention, abdominal pain, diarrhea and nausea  Endocrine: Negative for cold intolerance and heat intolerance  Genitourinary: Negative for dysuria and hematuria  Musculoskeletal: Negative for gait problem and myalgias  Chronic intermittent knee pain  Skin: Negative for pallor and rash  Neurological: Negative for dizziness, tremors, weakness, light-headedness and headaches  Psychiatric/Behavioral: Negative for behavioral problems  The patient is not nervous/anxious  Objective:  /80 (BP Location: Left arm, Patient Position: Sitting, Cuff Size: Adult)   Pulse 90   Temp 97 6 °F (36 4 °C) (Temporal)   Resp 16   Ht 5' 8" (1 727 m)   Wt 97 2 kg (214 lb 3 2 oz)   SpO2 95%   BMI 32 57 kg/m²        Physical Exam  Vitals and nursing note reviewed  Constitutional:       General: He is not in acute distress  Appearance: Normal appearance  He is obese  He is not diaphoretic  HENT:      Head: Normocephalic and atraumatic  Eyes:      General: No scleral icterus  Conjunctiva/sclera: Conjunctivae normal       Pupils: Pupils are equal, round, and reactive to light  Neck:      Thyroid: No thyromegaly     Cardiovascular:      Rate and Rhythm: Normal rate and regular rhythm  Pulses:           Carotid pulses are 0 on the right side and 0 on the left side  Heart sounds: Normal heart sounds  No murmur heard  Pulmonary:      Effort: Pulmonary effort is normal  No respiratory distress  Breath sounds: Normal breath sounds  No wheezing  Abdominal:      General: There is no distension  Palpations: Abdomen is soft  There is no mass  Tenderness: There is no abdominal tenderness  Musculoskeletal:         General: Normal range of motion  Cervical back: Neck supple  Right lower leg: No edema  Left lower leg: No edema  Lymphadenopathy:      Cervical: No cervical adenopathy  Skin:     General: Skin is warm  Coloration: Skin is not pale  Neurological:      Mental Status: He is alert and oriented to person, place, and time  Cranial Nerves: No cranial nerve deficit  Coordination: Coordination normal       Deep Tendon Reflexes: Reflexes are normal and symmetric  Psychiatric:         Behavior: Behavior normal          Thought Content:  Thought content normal          Judgment: Judgment normal

## 2022-07-19 LAB — HBA1C MFR BLD HPLC: 5.8 %

## 2022-07-19 PROCEDURE — 3044F HG A1C LEVEL LT 7.0%: CPT | Performed by: FAMILY MEDICINE

## 2022-09-06 DIAGNOSIS — I10 PRIMARY HYPERTENSION: Primary | ICD-10-CM

## 2022-09-06 DIAGNOSIS — E11.9 TYPE 2 DIABETES MELLITUS WITHOUT COMPLICATION, WITHOUT LONG-TERM CURRENT USE OF INSULIN (HCC): ICD-10-CM

## 2022-09-06 PROCEDURE — 4010F ACE/ARB THERAPY RXD/TAKEN: CPT | Performed by: FAMILY MEDICINE

## 2022-09-06 RX ORDER — LISINOPRIL 20 MG/1
20 TABLET ORAL DAILY
Qty: 90 TABLET | Refills: 3 | Status: SHIPPED | OUTPATIENT
Start: 2022-09-06

## 2022-09-06 RX ORDER — ROSUVASTATIN CALCIUM 40 MG/1
TABLET, COATED ORAL
Qty: 68 TABLET | Refills: 3 | Status: SHIPPED | OUTPATIENT
Start: 2022-09-06

## 2022-09-12 ENCOUNTER — OFFICE VISIT (OUTPATIENT)
Dept: FAMILY MEDICINE CLINIC | Facility: CLINIC | Age: 57
End: 2022-09-12
Payer: COMMERCIAL

## 2022-09-12 VITALS
RESPIRATION RATE: 16 BRPM | DIASTOLIC BLOOD PRESSURE: 80 MMHG | SYSTOLIC BLOOD PRESSURE: 142 MMHG | HEART RATE: 104 BPM | WEIGHT: 215.8 LBS | BODY MASS INDEX: 32.71 KG/M2 | OXYGEN SATURATION: 95 % | TEMPERATURE: 97.6 F | HEIGHT: 68 IN

## 2022-09-12 DIAGNOSIS — Z11.4 SCREENING FOR HIV (HUMAN IMMUNODEFICIENCY VIRUS): ICD-10-CM

## 2022-09-12 DIAGNOSIS — Z11.59 NEED FOR HEPATITIS C SCREENING TEST: ICD-10-CM

## 2022-09-12 DIAGNOSIS — Z23 ENCOUNTER FOR IMMUNIZATION: ICD-10-CM

## 2022-09-12 PROCEDURE — 99214 OFFICE O/P EST MOD 30 MIN: CPT | Performed by: FAMILY MEDICINE

## 2022-09-13 ENCOUNTER — TELEPHONE (OUTPATIENT)
Dept: FAMILY MEDICINE CLINIC | Facility: CLINIC | Age: 57
End: 2022-09-13

## 2022-09-13 NOTE — PROGRESS NOTES
Subjective:      Kodi aLmb is a 61 y o  male who presents to the office today for a preoperative consultation at the request of surgeon Dr Alvaro Villanueva who plans on performing  RTKR on September 19  Planned anesthesia is to be determined    The patient has the following known anesthesia issues: family history of problems with anesthesia  Patient has a bleeding risk of: no recent abnormal bleeding  Review of Systems  Review of Systems   Constitutional: Negative for appetite change, fatigue, fever and unexpected weight change  HENT: Negative for congestion, ear pain, postnasal drip, rhinorrhea, sinus pressure, sinus pain and sore throat  Eyes: Negative for redness and visual disturbance  Respiratory: Negative for chest tightness and shortness of breath  Cardiovascular: Negative for chest pain, palpitations and leg swelling  Gastrointestinal: Negative for abdominal distention, abdominal pain, diarrhea and nausea  Endocrine: Negative for cold intolerance and heat intolerance  Genitourinary: Negative for dysuria and hematuria  Musculoskeletal: Negative for gait problem and myalgias  Chronic bilateral knee pain right worse than left   Skin: Negative for pallor and rash  Neurological: Negative for dizziness, tremors, weakness, light-headedness and headaches  Psychiatric/Behavioral: Negative for behavioral problems  The patient is not nervous/anxious  Objective:      Physical Exam    /80 (BP Location: Left arm, Patient Position: Sitting, Cuff Size: Adult)   Pulse 104   Temp 97 6 °F (36 4 °C) (Temporal)   Resp 16   Ht 5' 8" (1 727 m)   Wt 97 9 kg (215 lb 12 8 oz)   SpO2 95%   BMI 32 81 kg/m²     Physical Exam  Vitals and nursing note reviewed  Constitutional:       General: He is not in acute distress  Appearance: Normal appearance  He is obese  He is not ill-appearing or diaphoretic  HENT:      Head: Normocephalic and atraumatic     Eyes: General: No scleral icterus  Conjunctiva/sclera: Conjunctivae normal       Pupils: Pupils are equal, round, and reactive to light  Neck:      Thyroid: No thyromegaly  Cardiovascular:      Rate and Rhythm: Normal rate and regular rhythm  Pulses:           Carotid pulses are 0 on the right side and 0 on the left side  Heart sounds: Normal heart sounds  No murmur heard  Pulmonary:      Effort: Pulmonary effort is normal  No respiratory distress  Breath sounds: Normal breath sounds  No wheezing  Abdominal:      General: There is no distension  Palpations: Abdomen is soft  There is no mass  Tenderness: There is no abdominal tenderness  Musculoskeletal:         General: Normal range of motion  Cervical back: Neck supple  Right lower leg: No edema  Left lower leg: No edema  Lymphadenopathy:      Cervical: No cervical adenopathy  Skin:     General: Skin is warm  Coloration: Skin is not pale  Neurological:      Mental Status: He is alert and oriented to person, place, and time  Cranial Nerves: No cranial nerve deficit  Coordination: Coordination normal       Deep Tendon Reflexes: Reflexes are normal and symmetric  Psychiatric:         Behavior: Behavior normal          Thought Content: Thought content normal          Judgment: Judgment normal        BMI Counseling: Body mass index is 32 81 kg/m²  The BMI is above normal  Nutrition recommendations include decreasing portion sizes, encouraging healthy choices of fruits and vegetables, decreasing fast food intake, consuming healthier snacks, limiting drinks that contain sugar, reducing intake of saturated and trans fat and reducing intake of cholesterol  Exercise recommendations include exercising 3-5 times per week  Rationale for BMI follow-up plan is due to patient being overweight or obese  Predictors of intubation difficulty:  Morbid obesity?  no  Anatomically abnormal facies? no  Short, thick neck? yes -   Neck range of motion: normal    Cardiographics  ECG: NSR w 1st degree AV block  No acute ST/T changes  Imaging  Chest x-ray: N/A    Lab Review   Pre-admission labs show A1c of 5 7 which is excellent for this diabetic patient  Rest of is chemistries, PT, APTT are within range  Assessment:      61 y o  male with planned surgery as above  Known risk factors for perioperative complications: Diabetes mellitus    Difficulty with intubation is not anticipated  Can walk 2 blocks without difficulty:  Can walk up 2 flights of stairs without difficulty:      1  Need for hepatitis C screening test     2  Screening for HIV (human immunodeficiency virus)     3  Encounter for immunization            Plan:        No problem-specific Assessment & Plan notes found for this encounter  Patient is medically cleared for surgery  Anesthesia to be determined the morning of surgery per patient  Patient will hold his Ace inhibitor and hydrochlorothiazide the morning of surgery as well as his metformin  He will take his amlodipine the morning of surgery  Recommend no aspirin or anti-inflammatories 7 days prior to surgery  This also includes Omega 3 fatty acids  He plans on doing is left knee surgery 2 months from now  We will see him back again for preop clearance for that  Patient is motivated  We wish him well  Patient has no known allergies    Past Medical History:   Diagnosis Date    Hyperlipidemia     Hypertension     Pre-diabetes      Past Surgical History:   Procedure Laterality Date    KNEE ARTHROSCOPY       Patient Active Problem List   Diagnosis    Synovitis and tenosynovitis    Pain in joint of right shoulder    Microhematuria    Localized, primary osteoarthritis    Impingement syndrome of shoulder region    Chondromalacia patellae    Current tear of medial cartilage or meniscus of knee    Calcific tendinitis of right shoulder    Arthralgia of both knees    Acquired trigger finger    Class 1 obesity due to excess calories without serious comorbidity with body mass index (BMI) of 32 0 to 32 9 in adult    Primary hypertension    Type 2 diabetes mellitus without complication, without long-term current use of insulin (HCC)     Social History     Socioeconomic History    Marital status: /Civil Union     Spouse name: None    Number of children: None    Years of education: None    Highest education level: None   Occupational History    None   Tobacco Use    Smoking status: Never Smoker    Smokeless tobacco: Never Used   Vaping Use    Vaping Use: Never used   Substance and Sexual Activity    Alcohol use:  Yes    Drug use: Never    Sexual activity: None   Other Topics Concern    None   Social History Narrative    Consumes 1 cup of coffee per day     Social Determinants of Health     Financial Resource Strain: Not on file   Food Insecurity: Not on file   Transportation Needs: Not on file   Physical Activity: Not on file   Stress: Not on file   Social Connections: Not on file   Intimate Partner Violence: Not on file   Housing Stability: Not on file       Current Outpatient Medications:     amLODIPine (NORVASC) 5 mg tablet, amlodipine 5 mg tablet, Disp: , Rfl:     Aspirin-Calcium Carbonate  MG TABS, aspirin 81 mg tablet, Disp: , Rfl:     cholecalciferol (VITAMIN D3) 1,000 units tablet, Take 2,000 Units by mouth daily, Disp: , Rfl:     hydrochlorothiazide (HYDRODIURIL) 25 mg tablet, hydrochlorothiazide 25 mg tablet, Disp: , Rfl:     lisinopril (ZESTRIL) 20 mg tablet, Take 1 tablet (20 mg total) by mouth daily, Disp: 90 tablet, Rfl: 3    metFORMIN (GLUCOPHAGE) 500 mg tablet, metformin 500 mg tablet, Disp: , Rfl:     rosuvastatin (CRESTOR) 40 MG tablet, Alternate 1 tablet every other day with half tablet every other day, Disp: 68 tablet, Rfl: 3  No results found for: WBC, HGB, HCT, MCV, PLT  Lab Results   Component Value Date    K 3 5 06/06/2018     06/06/2018    CO2 29 06/06/2018    BUN 20 06/06/2018    CREATININE 1 05 06/06/2018    GLUF 100 (H) 06/06/2018    CALCIUM 9 3 06/06/2018    AST 26 06/06/2018    ALT 23 06/06/2018    ALKPHOS 57 06/06/2018    EGFR 81 06/06/2018     No results found for: IRAJ        [unfilled]    Current Outpatient Medications:     amLODIPine (NORVASC) 5 mg tablet, amlodipine 5 mg tablet, Disp: , Rfl:     Aspirin-Calcium Carbonate  MG TABS, aspirin 81 mg tablet, Disp: , Rfl:     cholecalciferol (VITAMIN D3) 1,000 units tablet, Take 2,000 Units by mouth daily, Disp: , Rfl:     hydrochlorothiazide (HYDRODIURIL) 25 mg tablet, hydrochlorothiazide 25 mg tablet, Disp: , Rfl:     lisinopril (ZESTRIL) 20 mg tablet, Take 1 tablet (20 mg total) by mouth daily, Disp: 90 tablet, Rfl: 3    metFORMIN (GLUCOPHAGE) 500 mg tablet, metformin 500 mg tablet, Disp: , Rfl:     rosuvastatin (CRESTOR) 40 MG tablet, Alternate 1 tablet every other day with half tablet every other day, Disp: 68 tablet, Rfl: 3  No Known Allergies

## 2022-10-25 DIAGNOSIS — Z12.5 PROSTATE CANCER SCREENING: Primary | ICD-10-CM

## 2022-11-07 ENCOUNTER — RA CDI HCC (OUTPATIENT)
Dept: OTHER | Facility: HOSPITAL | Age: 57
End: 2022-11-07

## 2022-11-07 NOTE — PROGRESS NOTES
[facility]  coding opportunities       Chart reviewed, no opportunity found: CHART REVIEWED, NO OPPORTUNITY FOUND        Patients Insurance        Commercial Insurance: [employer]

## 2022-11-09 ENCOUNTER — OFFICE VISIT (OUTPATIENT)
Dept: FAMILY MEDICINE CLINIC | Facility: CLINIC | Age: 57
End: 2022-11-09

## 2022-11-09 VITALS
WEIGHT: 205.6 LBS | DIASTOLIC BLOOD PRESSURE: 90 MMHG | OXYGEN SATURATION: 97 % | TEMPERATURE: 97.6 F | HEIGHT: 68 IN | RESPIRATION RATE: 16 BRPM | HEART RATE: 97 BPM | SYSTOLIC BLOOD PRESSURE: 140 MMHG | BODY MASS INDEX: 31.16 KG/M2

## 2022-11-09 DIAGNOSIS — Z01.818 PREOPERATIVE CLEARANCE: Primary | ICD-10-CM

## 2022-11-09 DIAGNOSIS — M17.12 PRIMARY OSTEOARTHRITIS OF LEFT KNEE: ICD-10-CM

## 2022-11-09 DIAGNOSIS — I10 PRIMARY HYPERTENSION: ICD-10-CM

## 2022-11-09 DIAGNOSIS — E11.9 TYPE 2 DIABETES MELLITUS WITHOUT COMPLICATION, WITHOUT LONG-TERM CURRENT USE OF INSULIN (HCC): ICD-10-CM

## 2022-11-09 DIAGNOSIS — E78.5 HYPERLIPIDEMIA, UNSPECIFIED HYPERLIPIDEMIA TYPE: ICD-10-CM

## 2022-11-10 ENCOUNTER — TELEPHONE (OUTPATIENT)
Dept: FAMILY MEDICINE CLINIC | Facility: CLINIC | Age: 57
End: 2022-11-10

## 2022-11-10 PROBLEM — N40.1 BENIGN PROSTATIC HYPERPLASIA WITH LOWER URINARY TRACT SYMPTOMS: Status: ACTIVE | Noted: 2022-08-30

## 2022-11-10 PROBLEM — M25.569 KNEE PAIN: Status: ACTIVE | Noted: 2022-09-19

## 2022-11-10 NOTE — PROGRESS NOTES
Subjective:      Cecile Lazar is a 61 y o  male who presents to the office today for a preoperative consultation at the request of surgeon Dr Portia Weiss who plans on performing LTKR on November 14  Planned anesthesia is spinal  The patient has the following known anesthesia issues: none  Patient has a bleeding risk of: no recent abnormal bleeding  Review of Systems  Review of Systems   Constitutional: Negative for appetite change, fatigue, fever and unexpected weight change  HENT: Negative for congestion, ear pain, postnasal drip, rhinorrhea, sinus pressure, sinus pain and sore throat  Eyes: Negative for redness and visual disturbance  Respiratory: Negative for chest tightness and shortness of breath  Cardiovascular: Negative for chest pain, palpitations and leg swelling  Gastrointestinal: Negative for abdominal distention, abdominal pain, diarrhea and nausea  Endocrine: Negative for cold intolerance and heat intolerance  Genitourinary: Negative for dysuria and hematuria  Musculoskeletal: Negative for gait problem and myalgias  Chronic intermittent left knee pain  Status post right knee replacement   Skin: Negative for pallor and rash  Neurological: Negative for dizziness, tremors, weakness, light-headedness and headaches  Psychiatric/Behavioral: Negative for behavioral problems  The patient is not nervous/anxious  Objective:      Physical Exam    /90 (BP Location: Left arm, Patient Position: Sitting, Cuff Size: Adult)   Pulse 97   Temp 97 6 °F (36 4 °C) (Temporal)   Resp 16   Ht 5' 8" (1 727 m)   Wt 93 3 kg (205 lb 9 6 oz)   SpO2 97%   BMI 31 26 kg/m²     Physical Exam  Vitals and nursing note reviewed  Constitutional:       General: He is not in acute distress  Appearance: Normal appearance  He is obese  He is not ill-appearing or diaphoretic  HENT:      Head: Normocephalic and atraumatic        Mouth/Throat:      Pharynx: Oropharynx is clear    Eyes:      General: No scleral icterus  Conjunctiva/sclera: Conjunctivae normal       Pupils: Pupils are equal, round, and reactive to light  Neck:      Thyroid: No thyromegaly  Cardiovascular:      Rate and Rhythm: Normal rate and regular rhythm  Pulses:           Carotid pulses are 0 on the right side and 0 on the left side  Heart sounds: Normal heart sounds  No murmur heard  Pulmonary:      Effort: Pulmonary effort is normal  No respiratory distress  Breath sounds: Normal breath sounds  No wheezing  Abdominal:      General: There is no distension  Palpations: Abdomen is soft  There is no mass  Tenderness: There is no abdominal tenderness  Musculoskeletal:      Cervical back: Normal range of motion and neck supple  Right lower leg: No edema  Left lower leg: No edema  Comments: Right total knee incision looks great   Lymphadenopathy:      Cervical: No cervical adenopathy  Skin:     General: Skin is warm  Coloration: Skin is not pale  Neurological:      General: No focal deficit present  Mental Status: He is alert and oriented to person, place, and time  Cranial Nerves: No cranial nerve deficit  Coordination: Coordination normal       Deep Tendon Reflexes: Reflexes are normal and symmetric  Psychiatric:         Mood and Affect: Mood normal          Behavior: Behavior normal          Thought Content: Thought content normal          Judgment: Judgment normal             Predictors of intubation difficulty:  Morbid obesity? no  Anatomically abnormal facies? no  Short, thick neck? no  Neck range of motion: normal    Cardiographics  ECG: N/A, previous EKG prior to 1st right knee total replacement was normal    Imaging  Chest x-ray: N/A    Lab Review   All preop labs in good range  Most recent A1c is 5 3 which is excellent      Assessment:      61 y o  male with planned surgery as above      Known risk factors for perioperative complications: Diabetes mellitus        Can walk 2 blocks without difficulty:  Can walk up 2 flights of stairs without difficulty:      1  Encounter for immunization     2  Type 2 diabetes mellitus without complication, without long-term current use of insulin ([facility])  Hemoglobin A1C    Comprehensive metabolic panel    Microalbumin / creatinine urine ratio   3  Hyperlipidemia, unspecified hyperlipidemia type  Lipid Panel with Direct LDL reflex          Plan:        No problem-specific Assessment & Plan notes found for this encounter  Patient is medically cleared for surgery  A1c excellent at 5 3  Patient did very well with this 1st right total knee replacement  Should do quite fine with this  Knows to hold the lisinopril the morning of surgery as well as his metformin  Blood pressure in the office today borderline  Home blood pressures better  Recheck 6 months with repeat labs  Recommend yearly diabetic eye and foot examination  Flu vaccine and COVID vaccine discussed  Continue risk factor modification with regards to obesity      Patient has no known allergies    Past Medical History:   Diagnosis Date   • Hyperlipidemia    • Hypertension    • Pre-diabetes      Past Surgical History:   Procedure Laterality Date   • KNEE ARTHROSCOPY       Patient Active Problem List   Diagnosis   • Synovitis and tenosynovitis   • Pain in joint of right shoulder   • Microhematuria   • Localized, primary osteoarthritis   • Impingement syndrome of shoulder region   • Chondromalacia patellae   • Current tear of medial cartilage or meniscus of knee   • Calcific tendinitis of right shoulder   • Arthralgia of both knees   • Acquired trigger finger   • Class 1 obesity due to excess calories without serious comorbidity with body mass index (BMI) of 32 0 to 32 9 in adult   • Primary hypertension   • Type 2 diabetes mellitus without complication, without long-term current use of insulin ([facility] )     Social History     Socioeconomic History   • Marital status: /Civil Union     Spouse name: None   • Number of children: None   • Years of education: None   • Highest education level: None   Occupational History   • None   Tobacco Use   • Smoking status: Never Smoker   • Smokeless tobacco: Never Used   Vaping Use   • Vaping Use: Never used   Substance and Sexual Activity   • Alcohol use:  Yes   • Drug use: Never   • Sexual activity: None   Other Topics Concern   • None   Social History Narrative    Consumes 1 cup of coffee per day     Social Determinants of Health     Financial Resource Strain: Not on file   Food Insecurity: Not on file   Transportation Needs: Not on file   Physical Activity: Not on file   Stress: Not on file   Social Connections: Not on file   Intimate Partner Violence: Not on file   Housing Stability: Not on file       Current Outpatient Medications:   •  Aspirin-Calcium Carbonate  MG TABS, aspirin 81 mg tablet, Disp: , Rfl:   •  cholecalciferol (VITAMIN D3) 1,000 units tablet, Take 2,000 Units by mouth daily, Disp: , Rfl:   •  lisinopril (ZESTRIL) 20 mg tablet, Take 1 tablet (20 mg total) by mouth daily, Disp: 90 tablet, Rfl: 3  •  metFORMIN (GLUCOPHAGE) 500 mg tablet, metformin 500 mg tablet, Disp: , Rfl:   •  rosuvastatin (CRESTOR) 40 MG tablet, Alternate 1 tablet every other day with half tablet every other day, Disp: 68 tablet, Rfl: 3  No results found for: WBC, HGB, HCT, MCV, PLT  Lab Results   Component Value Date    K 3 5 06/06/2018     06/06/2018    CO2 29 06/06/2018    BUN 20 06/06/2018    CREATININE 1 05 06/06/2018    GLUF 100 (H) 06/06/2018    CALCIUM 9 3 06/06/2018    AST 26 06/06/2018    ALT 23 06/06/2018    ALKPHOS 57 06/06/2018    EGFR 81 06/06/2018     No results found for: 82 Debbi Meza        @[facility]@    Current Outpatient Medications:   •  Aspirin-Calcium Carbonate  MG TABS, aspirin 81 mg tablet, Disp: , Rfl:   •  cholecalciferol (VITAMIN D3) 1,000 units tablet, Take 2,000 Units by mouth daily, Disp: , Rfl:   •  lisinopril (ZESTRIL) 20 mg tablet, Take 1 tablet (20 mg total) by mouth daily, Disp: 90 tablet, Rfl: 3  •  metFORMIN (GLUCOPHAGE) 500 mg tablet, metformin 500 mg tablet, Disp: , Rfl:   •  rosuvastatin (CRESTOR) 40 MG tablet, Alternate 1 tablet every other day with half tablet every other day, Disp: 68 tablet, Rfl: 3  No Known Allergies

## 2023-05-11 ENCOUNTER — RA CDI HCC (OUTPATIENT)
Dept: OTHER | Facility: HOSPITAL | Age: 58
End: 2023-05-11

## 2023-05-11 NOTE — PROGRESS NOTES
[facility]  coding opportunities       Chart reviewed, no opportunity found: CHART REVIEWED, NO OPPORTUNITY FOUND        Patients Insurance        Commercial Insurance: [employer]

## 2023-05-15 LAB
CREAT ?TM UR-SCNC: 246.4 UMOL/L
EXT ALBUMIN URINE RANDOM: 1
HBA1C MFR BLD HPLC: 5.7 %
MICROALBUMIN/CREAT UR: 4.1 MG/G{CREAT}

## 2023-05-17 ENCOUNTER — OFFICE VISIT (OUTPATIENT)
Dept: FAMILY MEDICINE CLINIC | Facility: CLINIC | Age: 58
End: 2023-05-17

## 2023-05-17 VITALS
DIASTOLIC BLOOD PRESSURE: 72 MMHG | BODY MASS INDEX: 33.4 KG/M2 | HEIGHT: 68 IN | SYSTOLIC BLOOD PRESSURE: 110 MMHG | HEART RATE: 94 BPM | WEIGHT: 220.4 LBS | OXYGEN SATURATION: 98 %

## 2023-05-17 DIAGNOSIS — E78.2 MIXED HYPERLIPIDEMIA: ICD-10-CM

## 2023-05-17 DIAGNOSIS — I10 PRIMARY HYPERTENSION: ICD-10-CM

## 2023-05-17 DIAGNOSIS — Z00.00 HEALTHCARE MAINTENANCE: Primary | ICD-10-CM

## 2023-05-17 DIAGNOSIS — E11.9 TYPE 2 DIABETES MELLITUS WITHOUT COMPLICATION, WITHOUT LONG-TERM CURRENT USE OF INSULIN (HCC): ICD-10-CM

## 2023-05-17 RX ORDER — DULAGLUTIDE 0.75 MG/.5ML
0.75 INJECTION, SOLUTION SUBCUTANEOUS
Qty: 6 ML | Refills: 1 | Status: SHIPPED | OUTPATIENT
Start: 2023-05-17

## 2023-05-21 PROBLEM — M25.511 PAIN IN JOINT OF RIGHT SHOULDER: Status: RESOLVED | Noted: 2020-12-23 | Resolved: 2023-05-21

## 2023-05-21 PROBLEM — Z96.651 STATUS POST TOTAL RIGHT KNEE REPLACEMENT: Status: ACTIVE | Noted: 2023-04-08

## 2023-05-21 PROBLEM — M65.9 SYNOVITIS AND TENOSYNOVITIS: Status: RESOLVED | Noted: 2022-07-15 | Resolved: 2023-05-21

## 2023-05-21 PROBLEM — M65.90 SYNOVITIS AND TENOSYNOVITIS: Status: RESOLVED | Noted: 2022-07-15 | Resolved: 2023-05-21

## 2023-05-21 PROBLEM — R31.29 MICROHEMATURIA: Status: RESOLVED | Noted: 2022-07-12 | Resolved: 2023-05-21

## 2023-05-21 NOTE — PROGRESS NOTES
Assessment/Plan: Health maintenance completed  Patient agrees  Risk factor modification with regards to diet and exercise  Status post joint replacement  Needs to get aggressive  Refer to local dietitian  Blood pressure stable regarding type 2 diabetes patient's A1c is 5 7  Rest of labs are all good  No evidence of microalbuminuria  LDL cholesterol 70 continue statin    Health Maintenance  Lifestyle Advice: begin progressive daily aerobic exercise program, follow a low fat, low cholesterol diet and attempt to lose weight  Diet: well balanced diet  Exercise: occasionally  Dental: regular dental visits  Vision: no vision problems  Preventative Health: Male Preventative: Prostate cancer screening is up to date    No problem-specific Assessment & Plan notes found for this encounter  Diagnoses and all orders for this visit:    Healthcare maintenance    Type 2 diabetes mellitus without complication, without long-term current use of insulin (HCC)  -     dulaglutide (Trulicity) 1 58 ZE/8 6DR injection; Inject 0 5 mL (0 75 mg total) under the skin every 7 days  -     Hemoglobin A1C; Future  -     Comprehensive metabolic panel; Future  -     CT coronary calcium score; Future    Primary hypertension    Mixed hyperlipidemia  -     CT coronary calcium score; Future                  Subjective:      Patient ID: Lyle Adams is a 61 y o  male  Yearly physical   Status post knee replacement surgery  Overall doing well  Weight up  The following portions of the patient's history were reviewed and updated as appropriate: allergies, current medications, past family history, past medical history, past social history, past surgical history and problem list     Review of Systems   Constitutional: Negative for fatigue  Eyes: Negative for visual disturbance  Respiratory: Negative for cough and shortness of breath  Cardiovascular: Negative for chest pain, palpitations and leg swelling  "  Gastrointestinal: Negative for abdominal pain  Neurological: Negative for dizziness and headaches  Psychiatric/Behavioral: The patient is not nervous/anxious  Objective:    /72   Pulse 94   Ht 5' 8\" (1 727 m)   Wt 100 kg (220 lb 6 4 oz)   SpO2 98%   BMI 33 51 kg/m²          Physical Exam  Vitals and nursing note reviewed  Constitutional:       General: He is not in acute distress  Appearance: Normal appearance  He is obese  He is not ill-appearing or diaphoretic  HENT:      Head: Normocephalic and atraumatic  Eyes:      General: No scleral icterus  Conjunctiva/sclera: Conjunctivae normal       Pupils: Pupils are equal, round, and reactive to light  Neck:      Thyroid: No thyromegaly  Cardiovascular:      Rate and Rhythm: Normal rate and regular rhythm  Pulses:           Carotid pulses are 0 on the right side and 0 on the left side  Heart sounds: Normal heart sounds  No murmur heard  Pulmonary:      Effort: Pulmonary effort is normal  No respiratory distress  Breath sounds: Normal breath sounds  No wheezing  Abdominal:      General: There is no distension  Palpations: Abdomen is soft  Musculoskeletal:      Cervical back: Normal range of motion and neck supple  Right lower leg: No edema  Left lower leg: No edema  Lymphadenopathy:      Cervical: No cervical adenopathy  Skin:     General: Skin is warm  Coloration: Skin is not pale  Neurological:      General: No focal deficit present  Mental Status: He is alert and oriented to person, place, and time  Cranial Nerves: No cranial nerve deficit  Deep Tendon Reflexes: Reflexes are normal and symmetric  Psychiatric:         Mood and Affect: Mood normal          Behavior: Behavior normal          Thought Content: Thought content normal          Judgment: Judgment normal              Patient has no known allergies  Wander Calvillo had no medications administered " during this visit  Health Maintenance   Topic Date Due   • DM Eye Exam  Never done   • Annual Physical  Never done   • Pneumococcal Vaccine: Pediatrics (0 to 5 Years) and At-Risk Patients (6 to 59 Years) (2 - PCV) 09/19/2020   • PT PLAN OF CARE  02/07/2021   • COVID-19 Vaccine (5 - Booster for Moderna series) 12/16/2022   • Colorectal Cancer Screening  05/09/2023   • BMI: Followup Plan  09/13/2023   • Hepatitis C Screening  09/13/2023 (Originally 1965)   • HIV Screening  10/13/2023 (Originally 8/30/1980)   • Diabetic Foot Exam  09/13/2023   • HEMOGLOBIN A1C  11/15/2023   • Kidney Health Evaluation: GFR  05/15/2024   • Kidney Health Evaluation: Albumin/Creatinine Ratio  05/15/2024   • Depression Screening  05/17/2024   • BMI: Adult  05/17/2024   • DTaP,Tdap,and Td Vaccines (2 - Td or Tdap) 03/11/2029   • Influenza Vaccine  Completed   • HIB Vaccine  Aged Out   • IPV Vaccine  Aged Out   • Hepatitis A Vaccine  Aged Out   • Meningococcal ACWY Vaccine  Aged Out   • HPV Vaccine  Aged Out      Social History     Socioeconomic History   • Marital status: /Civil Union     Spouse name: Not on file   • Number of children: Not on file   • Years of education: Not on file   • Highest education level: Not on file   Occupational History   • Not on file   Tobacco Use   • Smoking status: Never   • Smokeless tobacco: Never   Vaping Use   • Vaping Use: Never used   Substance and Sexual Activity   • Alcohol use:  Yes   • Drug use: Never   • Sexual activity: Not on file   Other Topics Concern   • Not on file   Social History Narrative    Consumes 1 cup of coffee per day     Social Determinants of Health     Financial Resource Strain: Not on file   Food Insecurity: Not on file   Transportation Needs: Not on file   Physical Activity: Not on file   Stress: Not on file   Social Connections: Not on file   Intimate Partner Violence: Not on file   Housing Stability: Not on file      Family History   Problem Relation Age of Onset   • Cancer Mother    • Cancer Father    • Cancer Maternal Uncle       Past Medical History:   Diagnosis Date   • Hyperlipidemia    • Hypertension    • Pre-diabetes       has a past surgical history that includes Knee arthroscopy     Patient Active Problem List    Diagnosis Date Noted   • Status post total right knee replacement 04/08/2023   • Knee pain 09/19/2022   • Benign prostatic hyperplasia with lower urinary tract symptoms 08/30/2022   • Class 1 obesity due to excess calories without serious comorbidity with body mass index (BMI) of 32 0 to 32 9 in adult 07/17/2022   • Type 2 diabetes mellitus without complication, without long-term current use of insulin ([facility] ) 07/17/2022   • Localized, primary osteoarthritis 07/15/2022   • Chondromalacia patellae 07/15/2022   • Current tear of medial cartilage or meniscus of knee 07/15/2022   • Impingement syndrome of shoulder region 12/23/2020   • Calcific tendinitis of right shoulder 12/23/2020   • Acquired trigger finger 05/06/2020   • Arthralgia of both knees 03/21/2019   • Benign essential hypertension 07/25/2012   • Hyperlipidemia 09/23/2011       Current Outpatient Medications:   •  amLODIPine (NORVASC) 5 mg tablet, Take 1 tablet (5 mg total) by mouth daily, Disp: 90 tablet, Rfl: 3  •  Aspirin-Calcium Carbonate  MG TABS, aspirin 81 mg tablet, Disp: , Rfl:   •  cholecalciferol (VITAMIN D3) 1,000 units tablet, Take 2,000 Units by mouth daily, Disp: , Rfl:   •  dulaglutide (Trulicity) 0 81 UH/6 2VM injection, Inject 0 5 mL (0 75 mg total) under the skin every 7 days, Disp: 6 mL, Rfl: 1  •  hydrochlorothiazide (HYDRODIURIL) 25 mg tablet, Take 1 tablet (25 mg total) by mouth daily, Disp: 90 tablet, Rfl: 3  •  lisinopril (ZESTRIL) 20 mg tablet, Take 1 tablet (20 mg total) by mouth daily, Disp: 90 tablet, Rfl: 3  •  rosuvastatin (CRESTOR) 40 MG tablet, Alternate 1 tablet every other day with half tablet every other day, Disp: 68 tablet, Rfl: 3

## 2023-06-06 DIAGNOSIS — E11.9 TYPE 2 DIABETES MELLITUS WITHOUT COMPLICATION, WITHOUT LONG-TERM CURRENT USE OF INSULIN (HCC): ICD-10-CM

## 2023-06-06 RX ORDER — DULAGLUTIDE 0.75 MG/.5ML
0.75 INJECTION, SOLUTION SUBCUTANEOUS
Qty: 6 ML | Refills: 5 | Status: SHIPPED | OUTPATIENT
Start: 2023-06-06

## 2023-06-06 RX ORDER — DULAGLUTIDE 0.75 MG/.5ML
0.75 INJECTION, SOLUTION SUBCUTANEOUS
Qty: 6 ML | Refills: 0 | Status: SHIPPED | OUTPATIENT
Start: 2023-06-06 | End: 2023-06-06 | Stop reason: SDUPTHER

## 2023-07-11 DIAGNOSIS — E11.9 TYPE 2 DIABETES MELLITUS WITHOUT COMPLICATION, WITHOUT LONG-TERM CURRENT USE OF INSULIN (HCC): ICD-10-CM

## 2023-07-11 RX ORDER — DULAGLUTIDE 0.75 MG/.5ML
0.75 INJECTION, SOLUTION SUBCUTANEOUS
Qty: 6 ML | Refills: 3 | Status: SHIPPED | OUTPATIENT
Start: 2023-07-11

## 2023-09-27 ENCOUNTER — HOSPITAL ENCOUNTER (OUTPATIENT)
Dept: CT IMAGING | Facility: HOSPITAL | Age: 58
Discharge: HOME | End: 2023-09-27
Payer: COMMERCIAL

## 2023-09-27 ENCOUNTER — VBI (OUTPATIENT)
Dept: ADMINISTRATIVE | Facility: OTHER | Age: 58
End: 2023-09-27

## 2023-09-27 DIAGNOSIS — E78.2 MIXED HYPERLIPIDEMIA: ICD-10-CM

## 2023-09-27 DIAGNOSIS — E11.9 TYPE 2 DIABETES MELLITUS WITHOUT COMPLICATION, WITHOUT LONG-TERM CURRENT USE OF INSULIN (HCC): ICD-10-CM

## 2023-09-27 PROCEDURE — G1004 CDSM NDSC: HCPCS

## 2023-09-27 PROCEDURE — 75571 CT HRT W/O DYE W/CA TEST: CPT

## 2023-10-06 DIAGNOSIS — I10 PRIMARY HYPERTENSION: ICD-10-CM

## 2023-10-06 DIAGNOSIS — E11.9 TYPE 2 DIABETES MELLITUS WITHOUT COMPLICATION, WITHOUT LONG-TERM CURRENT USE OF INSULIN (HCC): ICD-10-CM

## 2023-10-06 LAB — HBA1C MFR BLD HPLC: 5.8 %

## 2023-10-06 RX ORDER — ROSUVASTATIN CALCIUM 40 MG/1
TABLET, COATED ORAL
Qty: 68 TABLET | Refills: 3 | Status: SHIPPED | OUTPATIENT
Start: 2023-10-06

## 2023-10-06 RX ORDER — LISINOPRIL 20 MG/1
20 TABLET ORAL DAILY
Qty: 90 TABLET | Refills: 3 | Status: SHIPPED | OUTPATIENT
Start: 2023-10-06

## 2023-10-07 ENCOUNTER — VBI (OUTPATIENT)
Dept: ADMINISTRATIVE | Facility: OTHER | Age: 58
End: 2023-10-07

## 2023-10-09 ENCOUNTER — OFFICE VISIT (OUTPATIENT)
Dept: FAMILY MEDICINE CLINIC | Facility: CLINIC | Age: 58
End: 2023-10-09
Payer: COMMERCIAL

## 2023-10-09 VITALS
SYSTOLIC BLOOD PRESSURE: 136 MMHG | DIASTOLIC BLOOD PRESSURE: 82 MMHG | OXYGEN SATURATION: 99 % | HEART RATE: 96 BPM | HEIGHT: 68 IN | BODY MASS INDEX: 34.28 KG/M2 | WEIGHT: 226.2 LBS

## 2023-10-09 DIAGNOSIS — I10 BENIGN ESSENTIAL HYPERTENSION: ICD-10-CM

## 2023-10-09 DIAGNOSIS — Z12.5 PROSTATE CANCER SCREENING: ICD-10-CM

## 2023-10-09 DIAGNOSIS — E78.5 HYPERLIPIDEMIA, UNSPECIFIED HYPERLIPIDEMIA TYPE: ICD-10-CM

## 2023-10-09 DIAGNOSIS — E11.9 TYPE 2 DIABETES MELLITUS WITHOUT COMPLICATION, WITHOUT LONG-TERM CURRENT USE OF INSULIN (HCC): Primary | ICD-10-CM

## 2023-10-09 DIAGNOSIS — E66.09 CLASS 1 OBESITY DUE TO EXCESS CALORIES WITHOUT SERIOUS COMORBIDITY WITH BODY MASS INDEX (BMI) OF 34.0 TO 34.9 IN ADULT: ICD-10-CM

## 2023-10-09 PROCEDURE — 99214 OFFICE O/P EST MOD 30 MIN: CPT | Performed by: FAMILY MEDICINE

## 2023-10-15 NOTE — PROGRESS NOTES
Assessment/Plan: Patient overall stable. Status post knee replacements doing very very well. Unfortunately weight has gone back up. Risk factor modification with regards to obesity. Blood pressure fair. Home blood pressures better. Continue statin ACE inhibitor and Trulicity. Current labs show A1c of 5.8 which is excellent. Recommend yearly diabetic eye and foot examination. Flu shot and COVID vaccines discussed. Aware that I am retiring. Recheck 6 months with repeat labs including prostate cancer screening. Have asked him to consider following up with our nurse practitioner Chuck Cui next office visit. Of note CT coronary calcium score came back at 0 which is excellent as he will turn 59 next year    No problem-specific Assessment & Plan notes found for this encounter. Diagnoses and all orders for this visit:    Type 2 diabetes mellitus without complication, without long-term current use of insulin (HCC)  -     Hemoglobin A1C; Future  -     Comprehensive metabolic panel; Future  -     Albumin / creatinine urine ratio; Future  -     Lipid Panel with Direct LDL reflex; Future  -     TSH, 3rd generation with Free T4 reflex; Future    Hyperlipidemia, unspecified hyperlipidemia type    Benign essential hypertension    Prostate cancer screening  -     PSA, Total Screen; Future    Class 1 obesity due to excess calories without serious comorbidity with body mass index (BMI) of 34.0 to 34.9 in adult        1. Type 2 diabetes mellitus without complication, without long-term current use of insulin (HCC)  Hemoglobin A1C    Comprehensive metabolic panel    Albumin / creatinine urine ratio    Lipid Panel with Direct LDL reflex    TSH, 3rd generation with Free T4 reflex      2. Hyperlipidemia, unspecified hyperlipidemia type        3. Benign essential hypertension        4. Prostate cancer screening  PSA, Total Screen      5.  Class 1 obesity due to excess calories without serious comorbidity with body mass index (BMI) of 34.0 to 34.9 in adult            Subjective:        Patient ID: Breezy Kaur is a 60 y.o. male. Chief Complaint   Patient presents with    Follow-up       Routine recheck. Overall doing well. No complaint has CT coronary artery calcium score        The following portions of the patient's history were reviewed and updated as appropriate: past medical history, past surgical history and problem list.      Review of Systems   Constitutional:  Negative for appetite change, fatigue, fever and unexpected weight change. HENT:  Negative for congestion, ear pain, postnasal drip, rhinorrhea, sinus pressure, sinus pain and sore throat. Eyes:  Negative for redness and visual disturbance. Respiratory:  Negative for chest tightness and shortness of breath. Cardiovascular:  Negative for chest pain, palpitations and leg swelling. Gastrointestinal:  Negative for abdominal distention, abdominal pain, diarrhea and nausea. Endocrine: Negative for cold intolerance and heat intolerance. Genitourinary:  Negative for dysuria and hematuria. Musculoskeletal:  Negative for arthralgias, gait problem and myalgias. Skin:  Negative for pallor and rash. Neurological:  Negative for dizziness, tremors, weakness, light-headedness and headaches. Psychiatric/Behavioral:  Negative for behavioral problems. The patient is not nervous/anxious. Objective:  /82 (BP Location: Left arm, Patient Position: Sitting, Cuff Size: Adult)   Pulse 96   Ht 5' 8" (1.727 m)   Wt 103 kg (226 lb 3.2 oz)   SpO2 99%   BMI 34.39 kg/m²        Physical Exam  Vitals and nursing note reviewed. Constitutional:       General: He is not in acute distress. Appearance: Normal appearance. He is obese. He is not ill-appearing. HENT:      Head: Normocephalic. Eyes:      General: No scleral icterus. Pupils: Pupils are equal, round, and reactive to light.    Cardiovascular:      Rate and Rhythm: Normal rate and regular rhythm. Heart sounds: Normal heart sounds. No murmur heard. Pulmonary:      Breath sounds: Normal breath sounds. Abdominal:      General: There is no distension. Musculoskeletal:      Right lower leg: No edema. Left lower leg: No edema. Lymphadenopathy:      Cervical: No cervical adenopathy. Skin:     Coloration: Skin is not pale. Neurological:      General: No focal deficit present. Mental Status: He is alert and oriented to person, place, and time. Mental status is at baseline. Psychiatric:         Mood and Affect: Mood normal.         Behavior: Behavior normal.         Thought Content:  Thought content normal.         Judgment: Judgment normal.

## 2023-12-17 ENCOUNTER — VBI (OUTPATIENT)
Dept: ADMINISTRATIVE | Facility: OTHER | Age: 58
End: 2023-12-17

## 2024-01-08 LAB
LEFT EYE DIABETIC RETINOPATHY: NORMAL
RIGHT EYE DIABETIC RETINOPATHY: NORMAL

## 2024-03-18 DIAGNOSIS — I10 PRIMARY HYPERTENSION: ICD-10-CM

## 2024-03-18 RX ORDER — HYDROCHLOROTHIAZIDE 25 MG/1
25 TABLET ORAL DAILY
Qty: 90 TABLET | Refills: 0 | Status: SHIPPED | OUTPATIENT
Start: 2024-03-18

## 2024-03-27 DIAGNOSIS — I10 PRIMARY HYPERTENSION: ICD-10-CM

## 2024-03-27 RX ORDER — AMLODIPINE BESYLATE 5 MG/1
5 TABLET ORAL DAILY
Qty: 90 TABLET | Refills: 1 | Status: SHIPPED | OUTPATIENT
Start: 2024-03-27

## 2024-05-24 DIAGNOSIS — E11.9 TYPE 2 DIABETES MELLITUS WITHOUT COMPLICATION, WITHOUT LONG-TERM CURRENT USE OF INSULIN (HCC): ICD-10-CM

## 2024-05-24 RX ORDER — DULAGLUTIDE 0.75 MG/.5ML
0.75 INJECTION, SOLUTION SUBCUTANEOUS
Qty: 6 ML | Refills: 1 | Status: SHIPPED | OUTPATIENT
Start: 2024-05-24 | End: 2024-05-31

## 2024-05-28 LAB — HBA1C MFR BLD HPLC: 5.9 %

## 2024-05-31 ENCOUNTER — TELEPHONE (OUTPATIENT)
Dept: ADMINISTRATIVE | Facility: OTHER | Age: 59
End: 2024-05-31

## 2024-05-31 ENCOUNTER — TELEPHONE (OUTPATIENT)
Age: 59
End: 2024-05-31

## 2024-05-31 ENCOUNTER — OFFICE VISIT (OUTPATIENT)
Dept: FAMILY MEDICINE CLINIC | Facility: CLINIC | Age: 59
End: 2024-05-31
Payer: COMMERCIAL

## 2024-05-31 DIAGNOSIS — I10 PRIMARY HYPERTENSION: ICD-10-CM

## 2024-05-31 DIAGNOSIS — Z12.5 PROSTATE CANCER SCREENING: ICD-10-CM

## 2024-05-31 DIAGNOSIS — E11.9 TYPE 2 DIABETES MELLITUS WITHOUT COMPLICATION, WITHOUT LONG-TERM CURRENT USE OF INSULIN (HCC): ICD-10-CM

## 2024-05-31 DIAGNOSIS — Z00.00 ANNUAL PHYSICAL EXAM: Primary | ICD-10-CM

## 2024-05-31 PROCEDURE — 99396 PREV VISIT EST AGE 40-64: CPT | Performed by: FAMILY MEDICINE

## 2024-05-31 RX ORDER — ROSUVASTATIN CALCIUM 40 MG/1
TABLET, COATED ORAL
Qty: 68 TABLET | Refills: 3 | Status: SHIPPED | OUTPATIENT
Start: 2024-05-31

## 2024-05-31 RX ORDER — LISINOPRIL 20 MG/1
20 TABLET ORAL DAILY
Qty: 90 TABLET | Refills: 3 | Status: SHIPPED | OUTPATIENT
Start: 2024-05-31

## 2024-05-31 RX ORDER — AMLODIPINE BESYLATE 5 MG/1
5 TABLET ORAL DAILY
Qty: 90 TABLET | Refills: 1 | Status: SHIPPED | OUTPATIENT
Start: 2024-05-31

## 2024-05-31 RX ORDER — HYDROCHLOROTHIAZIDE 25 MG/1
25 TABLET ORAL DAILY
Qty: 90 TABLET | Refills: 0 | Status: SHIPPED | OUTPATIENT
Start: 2024-05-31

## 2024-05-31 NOTE — TELEPHONE ENCOUNTER
----- Message from Crystal Braxton DO sent at 5/31/2024 10:50 AM EDT -----  Regarding: care gap request  05/31/24 10:50 AM    Hello, our patient attached above has had Diabetic Eye Exam completed/performed. Please assist in updating the patient chart by making an External outreach to Dr Tyson at [facility]  facility located in [place]. The date of service is 2023.    Thank you,  Crystal Braxton [facility] CARE

## 2024-05-31 NOTE — PROGRESS NOTES
Adult Annual Physical  Name: Wander Mccann      : 1965      MRN: [id number]  Encounter Provider: Crystal Braxton DO  Encounter Date: 2024   Encounter department: [facility] PRIMARY CARE    Assessment & Plan   1. Annual physical exam  2. Type 2 diabetes mellitus without complication, without long-term current use of insulin (HCC)  -     dulaglutide (Trulicity) 1.5 MG/0.5ML injection; Inject 0.5 mL (1.5 mg total) under the skin every 7 days  -     lisinopril (ZESTRIL) 20 mg tablet; Take 1 tablet (20 mg total) by mouth daily  -     rosuvastatin (CRESTOR) 40 MG tablet; ALTERNATE 1 TABLET EVERY OTHER DAY WITH 1/2 TABLET EVERY OTHER DAY  -     Hemoglobin A1C; Future; Expected date: 2024  -     Hemoglobin A1C  3. Primary hypertension  -     amLODIPine (NORVASC) 5 mg tablet; Take 1 tablet (5 mg total) by mouth daily  -     hydroCHLOROthiazide 25 mg tablet; Take 1 tablet (25 mg total) by mouth daily  -     lisinopril (ZESTRIL) 20 mg tablet; Take 1 tablet (20 mg total) by mouth daily  4. Prostate cancer screening  -     PSA, Total Screen; Future; Expected date: 2024  -     PSA, Total Screen    Immunizations and preventive care screenings were discussed with patient today. Appropriate education was printed on patient's after visit summary.    Discussed risks and benefits of prostate cancer screening. We discussed the controversial history of PSA screening for prostate cancer in the [place] as well as the risk of over detection and over treatment of prostate cancer by way of PSA screening.  The patient understands that PSA blood testing is an imperfect way to screen for prostate cancer and that elevated PSA levels in the blood may also be caused by infection, inflammation, prostatic trauma or manipulation, urological procedures, or by benign prostatic enlargement.    The role of the digital rectal examination in prostate cancer screening was also discussed and I discussed  with him that there is large interobserver variability in the findings of digital rectal examination.    Counseling:  Alcohol/drug use: discussed moderation in alcohol intake, the recommendations for healthy alcohol use  Dental Health: discussed importance of regular tooth brushing, flossing, and dental visits.  Injury prevention: discussed safety/seat belts, safety helmets, smoke detectors, carbon dioxide detectors.  Sexual health: no issues  Exercise: the importance of regular exercise/physical activity was discussed. Recommend exercise 5 times per week for at least 30 minutes.       Depression Screening and Follow-up Plan: Patient was screened for depression during today's encounter. They screened negative with a PHQ-2 score of 0.        History of Present Illness   Chief Complaint   Patient presents with   • Follow-up     6 month follow up   • Physical Exam     Yearly      Adult Annual Physical:  Patient presents for annual physical.     Diet and Physical Activity:  - Diet/Nutrition: well balanced diet, portion control and diabetic diet. could do better  - Exercise: walking. biking    Depression Screening:  - PHQ-2 Score: 0    General Health:  - Sleep: sleeps well. variable-OTC  - Hearing: normal hearing bilateral ears.  - Vision: most recent eye exam < 1 year ago.  - Dental: regular dental visits.     Health:  - History of STDs: no.   - Urinary symptoms: none.     Advanced Care Planning:  - Has an advanced directive?: yes    - Has a durable medical POA?: yes    - ACP document given to patient?: no      Review of Systems   Respiratory:  Negative for shortness of breath.    Cardiovascular:  Negative for chest pain.   Gastrointestinal: Negative.    Genitourinary: Negative.    Psychiatric/Behavioral: Negative.     All other systems reviewed and are negative.    Pertinent Medical History     Past Medical History   Past Medical History:   Diagnosis Date   • Hyperlipidemia    • Hypertension    • Pre-diabetes   "    Past Surgical History:   Procedure Laterality Date   • COLONOSCOPY  05/2018    Dr. Bowling.  Sigmoid diverticula.   • COLONOSCOPY  2006    Dr. Bowling. Tubular adenoma polyp removed from ascending colon.   • COLONOSCOPY  2005    Dr. Bowling.  Tubulovillous adenoma polyp removed from sigmoid colon.   • KNEE ARTHROSCOPY       Family History   Problem Relation Age of Onset   • Colon cancer Mother    • Lung cancer Father    • Cancer Maternal Uncle      Current Outpatient Medications on File Prior to Visit   Medication Sig Dispense Refill   • Aspirin-Calcium Carbonate  MG TABS aspirin 81 mg tablet     • cholecalciferol (VITAMIN D3) 1,000 units tablet Take 2,000 Units by mouth daily     • Na Sulfate-K Sulfate-Mg Sulf 17.5-3.13-1.6 GM/177ML SOLN Take 1 kit by mouth see administration instructions 177 mL 0     No current facility-administered medications on file prior to visit.   No Known Allergies   Current Outpatient Medications on File Prior to Visit   Medication Sig Dispense Refill   • Aspirin-Calcium Carbonate  MG TABS aspirin 81 mg tablet     • cholecalciferol (VITAMIN D3) 1,000 units tablet Take 2,000 Units by mouth daily     • Na Sulfate-K Sulfate-Mg Sulf 17.5-3.13-1.6 GM/177ML SOLN Take 1 kit by mouth see administration instructions 177 mL 0     No current facility-administered medications on file prior to visit.      Social History     Tobacco Use   • Smoking status: Never   • Smokeless tobacco: Never   Vaping Use   • Vaping status: Never Used   Substance and Sexual Activity   • Alcohol use: Not Currently   • Drug use: Never   • Sexual activity: Not on file       Objective     /78   Pulse 86   Temp 98 °F (36.7 °C) (Temporal)   Ht 5' 8\" (1.727 m)   Wt 105 kg (230 lb 9.6 oz)   SpO2 98%   BMI 35.06 kg/m²     Physical Exam  Vitals and nursing note reviewed.   Constitutional:       General: He is not in acute distress.     Appearance: Normal appearance. He is well-developed.      Comments: " 58-year-old male who appears his stated age with BMI of 35%   HENT:      Head: Normocephalic and atraumatic.   Eyes:      Conjunctiva/sclera: Conjunctivae normal.   Neck:      Vascular: No carotid bruit.   Cardiovascular:      Rate and Rhythm: Normal rate and regular rhythm.      Pulses: no weak pulses.           Dorsalis pedis pulses are 2+ on the right side and 2+ on the left side.        Posterior tibial pulses are 2+ on the right side and 2+ on the left side.      Heart sounds: No murmur heard.  Pulmonary:      Effort: Pulmonary effort is normal. No respiratory distress.      Breath sounds: Normal breath sounds.   Abdominal:      Palpations: Abdomen is soft.      Tenderness: There is no abdominal tenderness.   Musculoskeletal:         General: No swelling. Normal range of motion.   Feet:      Right foot:      Skin integrity: No ulcer, skin breakdown, erythema, warmth, callus or dry skin.      Left foot:      Skin integrity: No ulcer, skin breakdown, erythema, warmth, callus or dry skin.   Skin:     General: Skin is dry.      Findings: No rash.   Neurological:      Mental Status: He is alert and oriented to person, place, and time.      Sensory: No sensory deficit.      Gait: Gait normal.      Deep Tendon Reflexes: Reflexes normal.   Psychiatric:         Mood and Affect: Mood normal.         Thought Content: Thought content normal.         Judgment: Judgment normal.      Patient's shoes and socks removed.    Right Foot/Ankle   Right Foot Inspection  Skin Exam: skin normal and skin intact. No dry skin, no warmth, no callus, no erythema, no maceration, no abnormal color, no pre-ulcer, no ulcer and no callus.     Toe Exam: ROM and strength within normal limits.     Sensory   Monofilament testing: intact    Vascular  The right DP pulse is 2+. The right PT pulse is 2+.     Left Foot/Ankle  Left Foot Inspection  Skin Exam: skin normal and skin intact. No dry skin, no warmth, no erythema, no maceration, normal color, no  pre-ulcer, no ulcer and no callus.     Toe Exam: ROM and strength within normal limits.     Sensory   Monofilament testing: intact    Vascular  The left DP pulse is 2+. The left PT pulse is 2+.     Assign Risk Category  No deformity present  No loss of protective sensation  No weak pulses  Risk: 0

## 2024-05-31 NOTE — TELEPHONE ENCOUNTER
Pt called and has an appointment today. Pt had labs completed on 5/28 at [facility] and was asking if his lab results were in. Pt advised that the lab results were not showing in his chart and to reach out to [facility] to ask if the results are in and to have them faxed to the office.

## 2024-05-31 NOTE — LETTER
Procedure Request Form: Colonoscopy      Date Requested: 24  Patient: Wander Mccann  Patient : 1965   Referring Provider: Corey Braxton DO        Date of Procedure ______________________________       The above patient has informed us that they have completed their   most recent Colonoscopy at your facility. Please complete   this form and attach all corresponding procedure reports/results.    Comments __________________________________________________________  ____________________________________________________________________  ____________________________________________________________________  ____________________________________________________________________    Facility Completing Procedure _________________________________________    Form Completed By (print name) _______________________________________      Signature __________________________________________________________      These reports are needed for  compliance.    Please fax this completed form and a copy of the procedure report to our office located at [address] [place] [address] as soon as possible to Fax [phone] ivette Diaz: Phone [phone]    We thank you for your assistance in treating our mutual patient.

## 2024-06-06 VITALS
HEART RATE: 86 BPM | DIASTOLIC BLOOD PRESSURE: 78 MMHG | HEIGHT: 68 IN | WEIGHT: 230.6 LBS | OXYGEN SATURATION: 98 % | BODY MASS INDEX: 34.95 KG/M2 | SYSTOLIC BLOOD PRESSURE: 132 MMHG | TEMPERATURE: 98 F

## 2024-06-10 ENCOUNTER — TELEPHONE (OUTPATIENT)
Dept: ADMINISTRATIVE | Facility: OTHER | Age: 59
End: 2024-06-10

## 2024-06-10 NOTE — TELEPHONE ENCOUNTER
----- Message from Jessica RAMIREZ sent at 6/10/2024 10:39 AM EDT -----  06/10/24 10:39 AM    Hello, our patient Wander Mccann has had Diabetic Eye Exam completed/performed. Please assist in updating the patient chart by pulling the document from the Media Tab. The date of service is 6/7/2024.     Thank you,  Jessica Stallings  [facility] PRIMARY CARE

## 2024-06-10 NOTE — TELEPHONE ENCOUNTER
Upon review of the In Basket request we were able to locate, review, and update the patient chart as requested for Diabetic Eye Exam.    Any additional questions or concerns should be emailed to the Practice Liaisons via the appropriate education email address, please do not reply via In Basket.    Thank you  Alvarez Merino MA   [facility]

## 2024-06-11 NOTE — TELEPHONE ENCOUNTER
Upon review of the In Basket request and the patient's chart, initial outreach has been made via fax to facility. Please see Contacts section for details.     Thank you  Emily Villalobos

## 2024-06-21 NOTE — TELEPHONE ENCOUNTER
As a final attempt, a third outreach has been made via fax to facility. Please see Contacts section for details. This encounter will be closed and completed by end of day. Should we receive the requested information because of previous outreach attempts, the requested patient's chart will be updated appropriately.     Thank you  Emily Villalobos

## 2024-08-09 DIAGNOSIS — M27.40 CYSTIC LESION OF MANDIBLE DETERMINED BY X-RAY: Primary | ICD-10-CM

## 2024-08-22 DIAGNOSIS — K08.89 TOOTH PAIN: Primary | ICD-10-CM

## 2024-08-22 RX ORDER — AMOXICILLIN 500 MG/1
500 CAPSULE ORAL EVERY 8 HOURS SCHEDULED
Qty: 21 CAPSULE | Refills: 0 | Status: SHIPPED | OUTPATIENT
Start: 2024-08-22 | End: 2024-08-29

## 2024-08-22 RX ORDER — CHLORHEXIDINE GLUCONATE ORAL RINSE 1.2 MG/ML
15 SOLUTION DENTAL 2 TIMES DAILY
Qty: 120 ML | Refills: 0 | Status: SHIPPED | OUTPATIENT
Start: 2024-08-22

## 2024-08-29 DIAGNOSIS — I10 PRIMARY HYPERTENSION: ICD-10-CM

## 2024-08-29 RX ORDER — HYDROCHLOROTHIAZIDE 25 MG/1
25 TABLET ORAL DAILY
Qty: 90 TABLET | Refills: 1 | Status: SHIPPED | OUTPATIENT
Start: 2024-08-29

## 2024-11-25 DIAGNOSIS — E11.9 TYPE 2 DIABETES MELLITUS WITHOUT COMPLICATION, WITHOUT LONG-TERM CURRENT USE OF INSULIN (HCC): ICD-10-CM

## 2024-11-26 RX ORDER — DULAGLUTIDE 1.5 MG/.5ML
INJECTION, SOLUTION SUBCUTANEOUS
Qty: 3 ML | Refills: 0 | Status: SHIPPED | OUTPATIENT
Start: 2024-11-26

## 2024-12-09 DIAGNOSIS — I10 PRIMARY HYPERTENSION: ICD-10-CM

## 2024-12-10 RX ORDER — AMLODIPINE BESYLATE 5 MG/1
5 TABLET ORAL DAILY
Qty: 90 TABLET | Refills: 1 | Status: SHIPPED | OUTPATIENT
Start: 2024-12-10

## 2024-12-14 DIAGNOSIS — E11.9 TYPE 2 DIABETES MELLITUS WITHOUT COMPLICATION, WITHOUT LONG-TERM CURRENT USE OF INSULIN (HCC): ICD-10-CM

## 2024-12-16 RX ORDER — DULAGLUTIDE 1.5 MG/.5ML
INJECTION, SOLUTION SUBCUTANEOUS
Qty: 2 ML | Refills: 3 | Status: SHIPPED | OUTPATIENT
Start: 2024-12-16

## 2025-01-14 LAB
EST. AVERAGE GLUCOSE BLD GHB EST-MCNC: 126 MG/DL
HBA1C MFR BLD: 6 %
PSA SERPL-MCNC: 0.65 NG/ML

## 2025-01-15 ENCOUNTER — OFFICE VISIT (OUTPATIENT)
Dept: FAMILY MEDICINE CLINIC | Facility: CLINIC | Age: 60
End: 2025-01-15
Payer: COMMERCIAL

## 2025-01-15 VITALS
DIASTOLIC BLOOD PRESSURE: 70 MMHG | SYSTOLIC BLOOD PRESSURE: 120 MMHG | WEIGHT: 229.2 LBS | OXYGEN SATURATION: 96 % | TEMPERATURE: 97.3 F | HEIGHT: 68 IN | BODY MASS INDEX: 34.74 KG/M2 | HEART RATE: 96 BPM

## 2025-01-15 DIAGNOSIS — E78.00 PURE HYPERCHOLESTEROLEMIA: ICD-10-CM

## 2025-01-15 DIAGNOSIS — E11.9 TYPE 2 DIABETES MELLITUS WITHOUT COMPLICATION, WITHOUT LONG-TERM CURRENT USE OF INSULIN (HCC): ICD-10-CM

## 2025-01-15 DIAGNOSIS — Z00.00 ANNUAL PHYSICAL EXAM: Primary | ICD-10-CM

## 2025-01-15 PROCEDURE — 99396 PREV VISIT EST AGE 40-64: CPT | Performed by: FAMILY MEDICINE

## 2025-01-15 NOTE — PATIENT INSTRUCTIONS
"Patient Education     Routine physical for adults   The Basics   Written by the doctors and editors at [facility]   What is a physical? -- A physical is a routine visit, or \"check-up,\" with your doctor. You might also hear it called a \"wellness visit\" or \"preventive visit.\"  During each visit, the doctor will:   Ask about your physical and mental health   Ask about your habits, behaviors, and lifestyle   Do an exam   Give you vaccines if needed   Talk to you about any medicines you take   Give advice about your health   Answer your questions  Getting regular check-ups is an important part of taking care of your health. It can help your doctor find and treat any problems you have. But it's also important for preventing health problems.  A routine physical is different from a \"sick visit.\" A sick visit is when you see a doctor because of a health concern or problem. Since physicals are scheduled ahead of time, you can think about what you want to ask the doctor.  How often should I get a physical? -- It depends on your age and health. In general, for people age 21 years and older:   If you are younger than 50 years, you might be able to get a physical every 3 years.   If you are 50 years or older, your doctor might recommend a physical every year.  If you have an ongoing health condition, like diabetes or high blood pressure, your doctor will probably want to see you more often.  What happens during a physical? -- In general, each visit will include:   Physical exam - The doctor or nurse will check your height, weight, heart rate, and blood pressure. They will also look at your eyes and ears. They will ask about how you are feeling and whether you have any symptoms that bother you.   Medicines - It's a good idea to bring a list of all the medicines you take to each doctor visit. Your doctor will talk to you about your medicines and answer any questions. Tell them if you are having any side effects that bother you. You " "should also tell them if you are having trouble paying for any of your medicines.   Habits and behaviors - This includes:   Your diet   Your exercise habits   Whether you smoke, drink alcohol, or use drugs   Whether you are sexually active   Whether you feel safe at home  Your doctor will talk to you about things you can do to improve your health and lower your risk of health problems. They will also offer help and support. For example, if you want to quit smoking, they can give you advice and might prescribe medicines. If you want to improve your diet or get more physical activity, they can help you with this, too.   Lab tests, if needed - The tests you get will depend on your age and situation. For example, your doctor might want to check your:   Cholesterol   Blood sugar   Iron level   Vaccines - The recommended vaccines will depend on your age, health, and what vaccines you already had. Vaccines are very important because they can prevent certain serious or deadly infections.   Discussion of screening - \"Screening\" means checking for diseases or other health problems before they cause symptoms. Your doctor can recommend screening based on your age, risk, and preferences. This might include tests to check for:   Cancer, such as breast, prostate, cervical, ovarian, colorectal, prostate, lung, or skin cancer   Sexually transmitted infections, such as chlamydia and gonorrhea   Mental health conditions like depression and anxiety  Your doctor will talk to you about the different types of screening tests. They can help you decide which screenings to have. They can also explain what the results might mean.   Answering questions - The physical is a good time to ask the doctor or nurse questions about your health. If needed, they can refer you to other doctors or specialists, too.  Adults older than 65 years often need other care, too. As you get older, your doctor will talk to you about:   How to prevent falling at " home   Hearing or vision tests   Memory testing   How to take your medicines safely   Making sure that you have the help and support you need at home  All topics are updated as new evidence becomes available and our peer review process is complete.  This topic retrieved from [employer] on: May 02, 2024.  Topic 568727 Version 1.0  Release: 32.4.3 - C32.122  © 2024 UpToDate, Inc. and/or its affiliates. All rights reserved.  Consumer Information Use and Disclaimer   Disclaimer: This generalized information is a limited summary of diagnosis, treatment, and/or medication information. It is not meant to be comprehensive and should be used as a tool to help the user understand and/or assess potential diagnostic and treatment options. It does NOT include all information about conditions, treatments, medications, side effects, or risks that may apply to a specific patient. It is not intended to be medical advice or a substitute for the medical advice, diagnosis, or treatment of a health care provider based on the health care provider's examination and assessment of a patient's specific and unique circumstances. Patients must speak with a health care provider for complete information about their health, medical questions, and treatment options, including any risks or benefits regarding use of medications. This information does not endorse any treatments or medications as safe, effective, or approved for treating a specific patient. UpToDate, Inc. and its affiliates disclaim any warranty or liability relating to this information or the use thereof.The use of this information is governed by the Terms of Use, available at https://www.woltersiPrintuwer.com/en/know/clinical-effectiveness-terms. 2024© UpToDate, Inc. and its affiliates and/or licensors. All rights reserved.  Copyright   © 2024 UpToDate, Inc. and/or its affiliates. All rights reserved.

## 2025-01-15 NOTE — PROGRESS NOTES
Adult Annual Physical  Name: Wander Mccann      : 1965      MRN: [id number]  Encounter Provider: Crystal Braxton DO  Encounter Date: 1/15/2025   Encounter department: [facility] PRIMARY CARE    Assessment & Plan  Annual physical exam         Type 2 diabetes mellitus without complication, without long-term current use of insulin (HCC)    Lab Results   Component Value Date    HGBA1C 6.0 (H) 2025     Orders:  •  Albumin / creatinine urine ratio; Future  •  Basic metabolic panel; Future    Pure hypercholesterolemia    Orders:  •  Lipid panel; Future    Return in about 6 months (around 7/15/2025) for Recheck.  With Dr Rueda   Immunizations and preventive care screenings were discussed with patient today. Appropriate education was printed on patient's after visit summary.    Discussed risks and benefits of prostate cancer screening. We discussed the controversial history of PSA screening for prostate cancer in the [place] as well as the risk of over detection and over treatment of prostate cancer by way of PSA screening.  The patient understands that PSA blood testing is an imperfect way to screen for prostate cancer and that elevated PSA levels in the blood may also be caused by infection, inflammation, prostatic trauma or manipulation, urological procedures, or by benign prostatic enlargement.    The role of the digital rectal examination in prostate cancer screening was also discussed and I discussed with him that there is large interobserver variability in the findings of digital rectal examination.    Counseling:  Alcohol/drug use: discussed moderation in alcohol intake, the recommendations for healthy alcohol use  Dental Health: discussed importance of regular tooth brushing, flossing, and dental visits.  Injury prevention: discussed safety/seat belts, safety helmets, smoke detectors, carbon monoxide detector  Sexual health: discussed sexually transmitted diseases, partner  "selection, use of condoms, avoidance of unintended pregnancy, and contraceptive alternatives.  Exercise: the importance of regular exercise/physical activity was discussed. Recommend exercise 3-5 times per week for at least 30 minutes.          History of Present Illness   Chief Complaint   Patient presents with   • Annual Exam   • Follow-up     6 month     Labs reviewed:  Component      Latest Ref Rng 1/14/2025   Hemoglobin A1C      <5.7 % 6.0 (H)    eAG, EST AVG Glucose      mg/dL 126    PSA      <4.00 ng/mL 0.65         Adult Annual Physical:  Patient presents for annual physical.     Diet and Physical Activity:  - Diet/Nutrition: well balanced diet, portion control and low carb diet.  - Exercise: no formal exercise.    General Health:  - Sleep: sleeps well. staying asleep - mind race  - Hearing: normal hearing bilateral ears.  - Vision: vision problems and most recent eye exam < 1 year ago.  - Dental: regular dental visits.     Health:  - History of STDs: no.   - Urinary symptoms: none.     Advanced Care Planning:  - Has an advanced directive?: no    - Has a durable medical POA?: no      Review of Systems   All other systems reviewed and are negative.        Objective   /70 (BP Location: Left arm, Patient Position: Sitting)   Pulse 96   Temp (!) 97.3 °F (36.3 °C) (Temporal)   Ht 5' 8\" (1.727 m)   Wt 104 kg (229 lb 3.2 oz)   SpO2 96%   BMI 34.85 kg/m²     Physical Exam  Vitals and nursing note reviewed.   Constitutional:       General: He is not in acute distress.     Appearance: Normal appearance. He is well-developed.      Comments: 59 year old male appears age   HENT:      Head: Normocephalic and atraumatic.      Mouth/Throat:      Mouth: Mucous membranes are moist.      Pharynx: No posterior oropharyngeal erythema.   Eyes:      Conjunctiva/sclera: Conjunctivae normal.   Neck:      Vascular: No carotid bruit.   Cardiovascular:      Rate and Rhythm: Normal rate and regular rhythm.      Heart " sounds: No murmur heard.  Pulmonary:      Effort: Pulmonary effort is normal. No respiratory distress.      Breath sounds: Normal breath sounds.   Abdominal:      General: Bowel sounds are normal.      Palpations: Abdomen is soft.      Tenderness: There is no abdominal tenderness.   Musculoskeletal:         General: No swelling.   Skin:     General: Skin is warm.      Findings: No rash.   Neurological:      Mental Status: He is alert and oriented to person, place, and time.   Psychiatric:         Mood and Affect: Mood normal.         Behavior: Behavior normal.         Thought Content: Thought content normal.         Judgment: Judgment normal.

## 2025-01-15 NOTE — ASSESSMENT & PLAN NOTE
Lab Results   Component Value Date    HGBA1C 6.0 (H) 01/14/2025     Orders:  •  Albumin / creatinine urine ratio; Future  •  Basic metabolic panel; Future

## 2025-01-30 PROBLEM — M25.569 KNEE PAIN: Status: RESOLVED | Noted: 2022-09-19 | Resolved: 2025-01-30

## 2025-02-27 DIAGNOSIS — I10 PRIMARY HYPERTENSION: ICD-10-CM

## 2025-02-27 RX ORDER — HYDROCHLOROTHIAZIDE 25 MG/1
25 TABLET ORAL DAILY
Qty: 90 TABLET | Refills: 1 | Status: SHIPPED | OUTPATIENT
Start: 2025-02-27

## 2025-03-26 ENCOUNTER — OFFICE VISIT (OUTPATIENT)
Dept: FAMILY MEDICINE CLINIC | Facility: CLINIC | Age: 60
End: 2025-03-26
Payer: COMMERCIAL

## 2025-03-26 VITALS
BODY MASS INDEX: 35.01 KG/M2 | DIASTOLIC BLOOD PRESSURE: 72 MMHG | HEART RATE: 77 BPM | SYSTOLIC BLOOD PRESSURE: 134 MMHG | WEIGHT: 231 LBS | OXYGEN SATURATION: 94 % | TEMPERATURE: 96.8 F | HEIGHT: 68 IN | RESPIRATION RATE: 16 BRPM

## 2025-03-26 DIAGNOSIS — E78.5 HYPERLIPIDEMIA, UNSPECIFIED HYPERLIPIDEMIA TYPE: ICD-10-CM

## 2025-03-26 DIAGNOSIS — H92.02 LEFT EAR PAIN: ICD-10-CM

## 2025-03-26 DIAGNOSIS — I10 BENIGN ESSENTIAL HYPERTENSION: ICD-10-CM

## 2025-03-26 DIAGNOSIS — G51.0 BELL'S PALSY: Primary | ICD-10-CM

## 2025-03-26 DIAGNOSIS — R29.810 FACIAL DROOP: ICD-10-CM

## 2025-03-26 PROCEDURE — 99214 OFFICE O/P EST MOD 30 MIN: CPT | Performed by: FAMILY MEDICINE

## 2025-03-26 RX ORDER — VALACYCLOVIR HYDROCHLORIDE 500 MG/1
500 TABLET, FILM COATED ORAL 2 TIMES DAILY
COMMUNITY
Start: 2025-03-21 | End: 2025-03-28

## 2025-03-26 RX ORDER — PREDNISONE 10 MG/1
TABLET ORAL
Qty: 21 TABLET | Refills: 0 | Status: SHIPPED | OUTPATIENT
Start: 2025-03-26

## 2025-03-26 RX ORDER — AZITHROMYCIN 250 MG/1
TABLET, FILM COATED ORAL
Qty: 6 TABLET | Refills: 0 | Status: SHIPPED | OUTPATIENT
Start: 2025-03-26 | End: 2025-03-31

## 2025-03-26 NOTE — PATIENT INSTRUCTIONS
Here for f-up and used prednisone and finished Valtrex today - used twice daily. Call if worse. No other sensory neuro deficits. ER if worse or any new sensory motor deficits.

## 2025-03-26 NOTE — PROGRESS NOTES
Name: Wander Mccann      : 1965      MRN: [id number]  Encounter Provider: Mika Armendariz DO  Encounter Date: 3/26/2025   Encounter department: [facility] PRIMARY CARE  Chief Complaint   Patient presents with    Follow-up     ED F/U - 3/21/25 - [place] palsy     Patient Instructions   Here for f-up and used prednisone and finished Valtrex today - used twice daily. Call if worse. No other sensory neuro deficits. ER if worse or any new sensory motor deficits.     Assessment & Plan  Bell's palsy  Right sided facial droop, use rewetting drops  Orders:    predniSONE 10 mg tablet; Take 60 mg po day#1, 50 mg po day#2, 40 mg po day#3, 30 mg po day#4, 20 mg po day#5m and 10 mg po day#6    Ambulatory Referral to Neurology; Future    Left ear pain  Start abx as directed  Orders:    azithromycin (Zithromax) 250 mg tablet; Take 2 tablets (500 mg total) by mouth daily for 1 day, THEN 1 tablet (250 mg total) daily for 4 days.    Benign essential hypertension  Stable on med       Hyperlipidemia, unspecified hyperlipidemia type  Take cholesterol med       Facial droop  Consult neurology  Orders:    Ambulatory Referral to Neurology; Future      Depression Screening and Follow-up Plan: Patient was screened for depression during today's encounter. They screened negative with a PHQ-2 score of 0.          History of Present Illness     Follow-up (ED F/U - 3/21/25 - [place] palsy).       ER records reviewed:    History of Present Illness (HPI):   Chief Complaint   Patient presents with   · Facial Droop   L sided jaw pain began yesterday. Today began with tingling on lower L lip, then noticed dropping of lip & coffee spilling out. Mild L eye droop s well. GCS 15, speech clear, gait steady. Denies headaches or visual disturbances. Hx of bells palsy     History provided by: Patient  Neurologic Problem  Primary symptoms: facial droop and weakness   Primary symptoms: no chest pain, no dizziness and no headaches   :  started yesterday.  Symptom progression: Worsening  Focality: Focal  Associated Symptoms: no abdominal pain, no fatigue and no fever   Associated symptoms: weakness   Associated symptoms: weakness   Previous similar episodes: Yes       ED Course:   ED Course as of 03/21/25 1148   Fri Mar 21, 2025   1144 59-year-old male presents with right-sided facial droop which began yesterday. Patient noticed some left jaw and left ear pain and then noticed the development of a right-sided facial droop later on yesterday. He was prompted to come into the ER by family member. Exam is consistent with a peripheral 7th nerve palsy on the right all other cranial nerve evaluations were normal and neurologic exam was normal overall other than the right facial droop. Plan will be to start on steroid and antiviral. [GB]   1147 Based on my history and physical examination, I considered the following life or limb threatening disease(s) in my differential diagnosis:[place] Palsy    Based on my clinical acumen I will order the appropriate diagnostic testing to narrow my differential diagnosis and arrive at a final diagnosis. [GB]           Review of Systems   Constitutional: Negative.    HENT:  Positive for ear pain (left ear).    Eyes: Negative.    Respiratory: Negative.     Cardiovascular: Negative.    Gastrointestinal: Negative.    Endocrine: Negative.    Genitourinary: Negative.    Musculoskeletal: Negative.    Skin: Negative.    Allergic/Immunologic: Negative.    Neurological:  Negative for dizziness and headaches.        Right sided bell's palsy nad left ear pain   Hematological: Negative.    Psychiatric/Behavioral: Negative.       Past Medical History:   Diagnosis Date    Hyperlipidemia     Hypertension     Pre-diabetes      Past Surgical History:   Procedure Laterality Date    COLONOSCOPY  05/2018    Dr. Bowling.  Sigmoid diverticula.    COLONOSCOPY  2006    Dr. Bowling. Tubular adenoma polyp removed from ascending colon.    COLONOSCOPY   "2005    Dr. Bowling.  Tubulovillous adenoma polyp removed from sigmoid colon.    KNEE ARTHROSCOPY       Family History   Problem Relation Age of Onset    Colon cancer Mother     Lung cancer Father     Colon cancer Maternal Uncle     Cancer Maternal Uncle      Social History     Tobacco Use    Smoking status: Never    Smokeless tobacco: Never   Vaping Use    Vaping status: Never Used   Substance and Sexual Activity    Alcohol use: Not Currently    Drug use: Never    Sexual activity: Yes     Partners: Female     Current Outpatient Medications on File Prior to Visit   Medication Sig    amLODIPine (NORVASC) 5 mg tablet TAKE 1 TABLET DAILY    Aspirin-Calcium Carbonate  MG TABS aspirin 81 mg tablet    cholecalciferol (VITAMIN D3) 1,000 units tablet Take 2,000 Units by mouth daily    Dulaglutide (Trulicity) 1.5 MG/0.5ML SOAJ INJECT 0.5 ML (1.5 MG) UNDER THE SKIN EVERY 7 DAYS Strength: 1.5 MG/0.5ML    hydroCHLOROthiazide 25 mg tablet TAKE 1 TABLET DAILY    lisinopril (ZESTRIL) 20 mg tablet Take 1 tablet (20 mg total) by mouth daily    rosuvastatin (CRESTOR) 40 MG tablet ALTERNATE 1 TABLET EVERY OTHER DAY WITH 1/2 TABLET EVERY OTHER DAY    valACYclovir (VALTREX) 500 mg tablet Take 500 mg by mouth 2 (two) times a day     No Known Allergies  Immunization History   Administered Date(s) Administered    COVID-19 MODERNA VACC 0.5 ML IM 03/12/2021, 04/08/2021, 11/12/2021, 10/21/2022    COVID-19 Moderna mRNA Vaccine 12 Yr+ 50 mcg/0.5 mL (Spikevax) 10/20/2023, 10/25/2024    INFLUENZA 11/07/2017, 10/30/2018, 10/24/2019, 11/07/2019, 09/29/2020, 10/26/2021, 10/21/2022, 10/20/2023    Influenza, seasonal, injectable, preservative free 10/25/2024    Pneumococcal Polysaccharide PPV23 09/19/2019    Tdap 03/11/2019    Zoster Vaccine Recombinant 12/10/2019, 05/26/2020     Objective   /72   Pulse 77   Temp (!) 96.8 °F (36 °C) (Temporal)   Resp 16   Ht 5' 8\" (1.727 m)   Wt 105 kg (231 lb)   SpO2 94%   BMI 35.12 kg/m² "     Physical Exam  Constitutional:       Appearance: He is well-developed.   HENT:      Head: Normocephalic and atraumatic.      Right Ear: External ear normal.      Left Ear: External ear normal.      Nose: Nose normal.      Mouth/Throat:      Mouth: Mucous membranes are moist.   Eyes:      Conjunctiva/sclera: Conjunctivae normal.      Pupils: Pupils are equal, round, and reactive to light.   Cardiovascular:      Rate and Rhythm: Normal rate and regular rhythm.      Pulses: Normal pulses.      Heart sounds: Normal heart sounds.   Pulmonary:      Effort: Pulmonary effort is normal.      Breath sounds: Normal breath sounds.   Musculoskeletal:         General: Normal range of motion.      Cervical back: Normal range of motion and neck supple.   Skin:     General: Skin is warm and dry.      Capillary Refill: Capillary refill takes less than 2 seconds.   Neurological:      General: No focal deficit present.      Mental Status: He is alert and oriented to person, place, and time. Mental status is at baseline.      Deep Tendon Reflexes: Reflexes are normal and symmetric.      Comments: Bell's palsy right sided facial droop   Psychiatric:         Mood and Affect: Mood normal.         Behavior: Behavior normal.         Thought Content: Thought content normal.         Judgment: Judgment normal.       Administrative Statements   I have spent a total time of 31 minutes in caring for this patient on the day of the visit/encounter including Diagnostic results, Prognosis, Risks and benefits of tx options, Instructions for management, Patient and family education, Importance of tx compliance, Risk factor reductions, Impressions, Counseling / Coordination of care, Documenting in the medical record, Reviewing/placing orders in the medical record (including tests, medications, and/or procedures), and Obtaining or reviewing history  .

## 2025-04-01 ENCOUNTER — OFFICE VISIT (OUTPATIENT)
Dept: NEUROLOGY | Facility: CLINIC | Age: 60
End: 2025-04-01
Payer: COMMERCIAL

## 2025-04-01 VITALS
HEART RATE: 106 BPM | RESPIRATION RATE: 18 BRPM | HEIGHT: 68 IN | WEIGHT: 226.2 LBS | TEMPERATURE: 97.9 F | SYSTOLIC BLOOD PRESSURE: 110 MMHG | BODY MASS INDEX: 34.28 KG/M2 | DIASTOLIC BLOOD PRESSURE: 80 MMHG | OXYGEN SATURATION: 93 %

## 2025-04-01 DIAGNOSIS — G51.0 BELL'S PALSY: Primary | ICD-10-CM

## 2025-04-01 DIAGNOSIS — H92.02 LEFT EAR PAIN: ICD-10-CM

## 2025-04-01 PROCEDURE — 99204 OFFICE O/P NEW MOD 45 MIN: CPT | Performed by: PHYSICIAN ASSISTANT

## 2025-04-01 NOTE — ASSESSMENT & PLAN NOTE
Patient with right 7th cranial nerve palsy.  Prior to right facial paralysis he was having left jaw pain, left ear pain and swelling in the left face, question infectious etiology.  He was evaluated in the ED at [facility] on 3/21/2025 and diagnosed with Bell's palsy on the right.  He was given prednisone and valacyclovir, which he completed.  He saw his PCP on 3/26/2025 and was given additional course of prednisone as well as a Z-pack for his left ear pain.    He has had good recovery of R facial paralysis already.  He has good eye closure, nearly symmetric wrinkling of his forehead, can puff out cheek on the right etc.  He feels this has been recovering well.     He continues to have some L ear pain/fullness.  He says the second course of prednisone seemed to help a bit.  No signs of rash, vesicles or infection in the ear on exam today.     Discussed Bell's palsy in detail today.  It is possible he had an infection on the L that caused Bell's palsy on the R.  Discussed Bell's palsy is self-limiting and I cannot predict timeline of recovery, but he has been recovering well over the last 10 days.  There is no need for advanced neuro-imaging since he is recovering well with no additional neurologic deficits.    Will check Lyme for completeness, not done in the ED.      At this time, can see him back PRN     Orders:    Ambulatory Referral to Neurology    Lyme Total AB W Reflex to IGM/IGG; Future

## 2025-04-01 NOTE — PROGRESS NOTES
Name: Wander Mccann      : 1965      MRN: [id number]  Encounter Provider: Lona Galvez PA-C  Encounter Date: 2025   Encounter department: [facility] [place]  :  Assessment & Plan  Bell's palsy  Patient with right 7th cranial nerve palsy.  Prior to right facial paralysis he was having left jaw pain, left ear pain and swelling in the left face, question infectious etiology.  He was evaluated in the ED at [facility] on 3/21/2025 and diagnosed with Bell's palsy on the right.  He was given prednisone and valacyclovir, which he completed.  He saw his PCP on 3/26/2025 and was given additional course of prednisone as well as a Z-pack for his left ear pain.    He has had good recovery of R facial paralysis already.  He has good eye closure, nearly symmetric wrinkling of his forehead, can puff out cheek on the right etc.  He feels this has been recovering well.     He continues to have some L ear pain/fullness.  He says the second course of prednisone seemed to help a bit.  No signs of rash, vesicles or infection in the ear on exam today.     Discussed Bell's palsy in detail today.  It is possible he had an infection on the L that caused Bell's palsy on the R.  Discussed Bell's palsy is self-limiting and I cannot predict timeline of recovery, but he has been recovering well over the last 10 days.  There is no need for advanced neuro-imaging since he is recovering well with no additional neurologic deficits.    Will check Lyme for completeness, not done in the ED.      At this time, can see him back PRN     Orders:    Ambulatory Referral to Neurology    Lyme Total AB W Reflex to IGM/IGG; Future    Left ear pain  He should continue to follow with PCP for the L ear pain.  He could try decongestants and see if that helps.  May need to see ENT if this persists.             History of Present Illness   HPI   Wander Mccann is a 59 y.o. male with PMH of HTN, DM, HLD, who presents today  for new patient evaluation of Bell's palsy.    Patient presented to [facility] ED on 3/21/2025 with right facial droop/paralysis.  He reported he started with L jaw and ear pain and lost his taste.  He then noticed his right face was not moving, had trouble closing his R eye, was dribbling out of the R side of his mouth.  Evaluation in the ED consistent with R 7th CN palsy, otherwise neuro exam was normal.  He was treated with prednisone and valacyclovir.  He then saw his PCP on 3/26/25 and reported ongoing L ear pain.  He was given more prednisone and Z-pack.    Today, patient reports his right facial paralysis has been steadily improving.  Today was the first time he could fully puff out his cheek on the right and he is no longer dribbling/drooling out of the right side of his mouth.  He has much better eye closure and notices the wrinkling on his forehead is back as well.  He continues to have some discomfort and pressure in the left ear.  It does seem to be a bit better with the second course of steroids prescribed by his PCP.  He said he was having swelling on the left side of his face before he noticed the right facial paralysis.  There was no rash around his ear (either of them).  He denies any vision loss, speech or swallowing difficulty, numbness or weakness of the extremities, vertigo.    Review of Systems   Constitutional: Negative.  Negative for chills, fatigue and fever.   HENT:  Positive for ear pain (left). Negative for hearing loss, tinnitus and trouble swallowing.    Eyes:  Negative for photophobia, pain and visual disturbance.   Respiratory: Negative.  Negative for cough and shortness of breath.    Cardiovascular: Negative.  Negative for chest pain and palpitations.   Gastrointestinal:  Negative for constipation, diarrhea, nausea and vomiting.   Endocrine: Negative.    Genitourinary: Negative.  Negative for difficulty urinating and urgency.   Musculoskeletal: Negative.  Negative for gait problem.   Skin:  "Negative.  Negative for rash.   Neurological:  Positive for weakness (right face). Negative for dizziness, tremors, seizures, numbness and headaches.   Hematological: Negative.    Psychiatric/Behavioral:  Negative for confusion and sleep disturbance.     I have personally reviewed the MA's review of systems and made changes as necessary.    Current Outpatient Medications on File Prior to Visit   Medication Sig Dispense Refill    amLODIPine (NORVASC) 5 mg tablet TAKE 1 TABLET DAILY 90 tablet 1    Aspirin-Calcium Carbonate  MG TABS aspirin 81 mg tablet      azithromycin (Zithromax) 250 mg tablet Take 2 tablets (500 mg total) by mouth daily for 1 day, THEN 1 tablet (250 mg total) daily for 4 days. 6 tablet 0    cholecalciferol (VITAMIN D3) 1,000 units tablet Take 2,000 Units by mouth daily      Dulaglutide (Trulicity) 1.5 MG/0.5ML SOAJ INJECT 0.5 ML (1.5 MG) UNDER THE SKIN EVERY 7 DAYS Strength: 1.5 MG/0.5ML 2 mL 3    hydroCHLOROthiazide 25 mg tablet TAKE 1 TABLET DAILY 90 tablet 1    lisinopril (ZESTRIL) 20 mg tablet Take 1 tablet (20 mg total) by mouth daily 90 tablet 3    predniSONE 10 mg tablet Take 60 mg po day#1, 50 mg po day#2, 40 mg po day#3, 30 mg po day#4, 20 mg po day#5m and 10 mg po day#6 21 tablet 0    rosuvastatin (CRESTOR) 40 MG tablet ALTERNATE 1 TABLET EVERY OTHER DAY WITH 1/2 TABLET EVERY OTHER DAY 68 tablet 3    valACYclovir (VALTREX) 500 mg tablet Take 500 mg by mouth 2 (two) times a day       No current facility-administered medications on file prior to visit.         Objective   /80 (BP Location: Left arm, Patient Position: Sitting, Cuff Size: Standard)   Pulse (!) 106   Temp 97.9 °F (36.6 °C) (Temporal)   Resp 18   Ht 5' 8\" (1.727 m)   Wt 103 kg (226 lb 3.2 oz)   SpO2 93%   BMI 34.39 kg/m²     Physical Exam  Constitutional:       Appearance: Normal appearance.   HENT:      Head: Normocephalic and atraumatic.      Right Ear: Tympanic membrane, ear canal and external ear normal. "      Left Ear: Tympanic membrane, ear canal and external ear normal.      Mouth/Throat:      Mouth: Mucous membranes are moist.      Pharynx: Oropharynx is clear.   Eyes:      Extraocular Movements: EOM normal.      Pupils: Pupils are equal, round, and reactive to light.   Neurological:      Mental Status: He is alert.      Motor: Motor strength is normal.     Deep Tendon Reflexes:      Reflex Scores:       Bicep reflexes are 2+ on the right side and 2+ on the left side.       Brachioradialis reflexes are 2+ on the right side and 2+ on the left side.       Patellar reflexes are 1+ on the right side and 1+ on the left side.       Achilles reflexes are 2+ on the right side and 2+ on the left side.  Psychiatric:         Mood and Affect: Mood normal.         Speech: Speech normal.         Behavior: Behavior normal.       Neurological Exam  Mental Status  Alert. Oriented to person, place, time and situation. Speech is normal. Language is fluent with no aphasia. Attention and concentration are normal.    Cranial Nerves  CN II: Visual fields full to confrontation.  CN III, IV, VI: Extraocular movements intact bilaterally. Pupils equal round and reactive to light bilaterally.  CN V: Facial sensation is normal.  CN VII:  Right: There is peripheral facial weakness. Very minimal asymmetry noted.  Good eye closure, spontaneous blinking noted.  Almost symmetric wrinkling of the forehead.  Slightly flattened right NLF.  Can puff cheek out on the right..  CN VIII: Hearing is normal.  CN IX, X: Palate elevates symmetrically  CN XI: Shoulder shrug strength is normal.  CN XII: Tongue midline without atrophy or fasciculations.    Motor   No abnormal involuntary movements. Strength is 5/5 throughout all four extremities.    Sensory  Light touch is normal in upper and lower extremities.     Reflexes                                            Right                      Left  Brachioradialis                    2+                          2+  Biceps                                 2+                         2+  Patellar                                1+                         1+  Achilles                                2+                         2+    Coordination  Right: Finger-to-nose normal.Left: Finger-to-nose normal.    Gait  Casual gait is normal including stance, stride, and arm swing.

## 2025-04-01 NOTE — PATIENT INSTRUCTIONS
Facial palsy looks to be recovering very well.  I will check for Lyme--can get blood work done anytime  If the left ear pain is not getting better, call your PCP and see if maybe they want to refer to ENT.  Can try decongestant such as Flonase or sudafed and see if that helps  Return as needed

## 2025-04-16 DIAGNOSIS — G51.0 RIGHT-SIDED BELL'S PALSY: Primary | ICD-10-CM

## 2025-04-16 DIAGNOSIS — H92.02 LEFT EAR PAIN: ICD-10-CM

## 2025-04-16 DIAGNOSIS — H93.8X2 SENSATION OF FULLNESS IN LEFT EAR: ICD-10-CM

## 2025-05-22 DIAGNOSIS — I10 PRIMARY HYPERTENSION: ICD-10-CM

## 2025-05-22 DIAGNOSIS — E11.9 TYPE 2 DIABETES MELLITUS WITHOUT COMPLICATION, WITHOUT LONG-TERM CURRENT USE OF INSULIN (HCC): ICD-10-CM

## 2025-05-22 RX ORDER — LISINOPRIL 20 MG/1
20 TABLET ORAL DAILY
Qty: 90 TABLET | Refills: 1 | Status: SHIPPED | OUTPATIENT
Start: 2025-05-22

## 2025-05-22 RX ORDER — ROSUVASTATIN CALCIUM 40 MG/1
TABLET, COATED ORAL
Qty: 68 TABLET | Refills: 1 | Status: SHIPPED | OUTPATIENT
Start: 2025-05-22

## 2025-05-28 LAB
ALBUMIN/CREAT UR: 4.4
ANION GAP SERPL CALCULATED.3IONS-SCNC: 10 MMOL/L (ref 3–11)
BUN SERPL-MCNC: 17 MG/DL (ref 7–28)
CALCIUM SERPL-MCNC: 9.4 MG/DL (ref 8.5–10.5)
CHLORIDE SERPL-SCNC: 102 MMOL/L (ref 100–109)
CHOLEST SERPL-MCNC: 170 MG/DL
CHOLEST/HDLC SERPL: 3.8 {RATIO}
CO2 SERPL-SCNC: 28 MMOL/L (ref 21–31)
CREAT SERPL-MCNC: 1.01 MG/DL (ref 0.53–1.3)
CREAT UR-MCNC: 204.7 MG/DL (ref 50–200)
CYTOLOGY CMNT CVX/VAG CYTO-IMP: ABNORMAL
GFR/BSA.PRED SERPLBLD CYS-BASED-ARV: 85 ML/MIN/{1.73_M2}
GLUCOSE SERPL-MCNC: 103 MG/DL (ref 65–99)
HDLC SERPL-MCNC: 45 MG/DL (ref 23–92)
LDLC SERPL CALC-MCNC: 78 MG/DL
MICROALBUMIN UR-MCNC: 0.9 MG/DL
NONHDLC SERPL-MCNC: 125 MG/DL
POTASSIUM SERPL-SCNC: 4 MMOL/L (ref 3.5–5.2)
SODIUM SERPL-SCNC: 140 MMOL/L (ref 135–145)
TRIGL SERPL-MCNC: 233 MG/DL

## 2025-06-06 DIAGNOSIS — I10 PRIMARY HYPERTENSION: ICD-10-CM

## 2025-06-06 RX ORDER — AMLODIPINE BESYLATE 5 MG/1
5 TABLET ORAL DAILY
Qty: 90 TABLET | Refills: 1 | Status: SHIPPED | OUTPATIENT
Start: 2025-06-06

## 2025-08-12 ENCOUNTER — TELEPHONE (OUTPATIENT)
Age: 60
End: 2025-08-12